# Patient Record
Sex: FEMALE | Race: WHITE
[De-identification: names, ages, dates, MRNs, and addresses within clinical notes are randomized per-mention and may not be internally consistent; named-entity substitution may affect disease eponyms.]

---

## 2018-01-02 ENCOUNTER — HOSPITAL ENCOUNTER (INPATIENT)
Dept: HOSPITAL 92 - ERS | Age: 83
LOS: 6 days | Discharge: NURSING, REHAB OR LONG-TERM CARE | DRG: 193 | End: 2018-01-08
Attending: INTERNAL MEDICINE | Admitting: INTERNAL MEDICINE
Payer: MEDICARE

## 2018-01-02 VITALS — BODY MASS INDEX: 34.9 KG/M2

## 2018-01-02 DIAGNOSIS — Z86.73: ICD-10-CM

## 2018-01-02 DIAGNOSIS — Z96.653: ICD-10-CM

## 2018-01-02 DIAGNOSIS — J10.00: Primary | ICD-10-CM

## 2018-01-02 DIAGNOSIS — G89.29: ICD-10-CM

## 2018-01-02 DIAGNOSIS — J96.01: ICD-10-CM

## 2018-01-02 DIAGNOSIS — F41.9: ICD-10-CM

## 2018-01-02 DIAGNOSIS — J44.1: ICD-10-CM

## 2018-01-02 DIAGNOSIS — G30.9: ICD-10-CM

## 2018-01-02 DIAGNOSIS — K21.9: ICD-10-CM

## 2018-01-02 DIAGNOSIS — M62.82: ICD-10-CM

## 2018-01-02 DIAGNOSIS — Z66: ICD-10-CM

## 2018-01-02 DIAGNOSIS — E78.5: ICD-10-CM

## 2018-01-02 DIAGNOSIS — J10.1: ICD-10-CM

## 2018-01-02 DIAGNOSIS — E66.9: ICD-10-CM

## 2018-01-02 DIAGNOSIS — J18.9: ICD-10-CM

## 2018-01-02 DIAGNOSIS — I11.0: ICD-10-CM

## 2018-01-02 DIAGNOSIS — I50.33: ICD-10-CM

## 2018-01-02 DIAGNOSIS — M54.9: ICD-10-CM

## 2018-01-02 DIAGNOSIS — F02.80: ICD-10-CM

## 2018-01-02 LAB
ALBUMIN SERPL BCG-MCNC: 3.5 G/DL (ref 3.4–4.8)
ALP SERPL-CCNC: 108 U/L (ref 40–150)
ALT SERPL W P-5'-P-CCNC: 30 U/L (ref 8–55)
ANION GAP SERPL CALC-SCNC: 13 MMOL/L (ref 10–20)
AST SERPL-CCNC: 49 U/L (ref 5–34)
BASOPHILS # BLD AUTO: 0 THOU/UL (ref 0–0.2)
BASOPHILS NFR BLD AUTO: 0.3 % (ref 0–1)
BILIRUB SERPL-MCNC: 0.4 MG/DL (ref 0.2–1.2)
BUN SERPL-MCNC: 13 MG/DL (ref 9.8–20.1)
CALCIUM SERPL-MCNC: 8.5 MG/DL (ref 7.8–10.44)
CHLORIDE SERPL-SCNC: 98 MMOL/L (ref 98–107)
CK MB SERPL-MCNC: 1.8 NG/ML (ref 0–6.6)
CK SERPL-CCNC: 653 U/L (ref 29–168)
CO2 SERPL-SCNC: 24 MMOL/L (ref 23–31)
CREAT CL PREDICTED SERPL C-G-VRATE: 0 ML/MIN (ref 70–130)
CRYSTAL-AUWI FLAG: 0.5 (ref 0–15)
EOSINOPHIL # BLD AUTO: 0 THOU/UL (ref 0–0.7)
EOSINOPHIL NFR BLD AUTO: 0.2 % (ref 0–10)
GLOBULIN SER CALC-MCNC: 3.5 G/DL (ref 2.4–3.5)
GLUCOSE SERPL-MCNC: 107 MG/DL (ref 83–110)
HEV IGM SER QL: 22.2 (ref 0–7.99)
HGB BLD-MCNC: 13.6 G/DL (ref 12–16)
HYALINE CASTS #/AREA URNS LPF: (no result) LPF
LYMPHOCYTES # BLD: 1.1 THOU/UL (ref 1.2–3.4)
LYMPHOCYTES NFR BLD AUTO: 12.7 % (ref 21–51)
MCH RBC QN AUTO: 30.5 PG (ref 27–31)
MCV RBC AUTO: 91.1 FL (ref 81–99)
MONOCYTES # BLD AUTO: 0.8 THOU/UL (ref 0.11–0.59)
MONOCYTES NFR BLD AUTO: 9 % (ref 0–10)
NEUTROPHILS # BLD AUTO: 6.6 THOU/UL (ref 1.4–6.5)
NEUTROPHILS NFR BLD AUTO: 77.7 % (ref 42–75)
PATHC CAST-AUWI FLAG: 9.35 (ref 0–2.49)
PLATELET # BLD AUTO: 188 THOU/UL (ref 130–400)
POTASSIUM SERPL-SCNC: 3.7 MMOL/L (ref 3.5–5.1)
PROT UR STRIP.AUTO-MCNC: 30 MG/DL
RBC # BLD AUTO: 4.45 MILL/UL (ref 4.2–5.4)
RBC UR QL AUTO: (no result) HPF (ref 0–3)
SODIUM SERPL-SCNC: 131 MMOL/L (ref 136–145)
SP GR UR STRIP: 1.03 (ref 1–1.04)
SPERM-AUWI FLAG: 0 (ref 0–9.9)
TROPONIN I SERPL DL<=0.01 NG/ML-MCNC: 0.01 NG/ML (ref ?–0.03)
TROPONIN I SERPL DL<=0.01 NG/ML-MCNC: 0.02 NG/ML (ref ?–0.03)
WBC # BLD AUTO: 8.5 THOU/UL (ref 4.8–10.8)
WBC UR QL AUTO: (no result) HPF (ref 0–3)
YEAST-AUWI FLAG: 0 (ref 0–25)

## 2018-01-02 PROCEDURE — 51701 INSERT BLADDER CATHETER: CPT

## 2018-01-02 PROCEDURE — 87633 RESP VIRUS 12-25 TARGETS: CPT

## 2018-01-02 PROCEDURE — 71250 CT THORAX DX C-: CPT

## 2018-01-02 PROCEDURE — 80048 BASIC METABOLIC PNL TOTAL CA: CPT

## 2018-01-02 PROCEDURE — 82553 CREATINE MB FRACTION: CPT

## 2018-01-02 PROCEDURE — 96365 THER/PROPH/DIAG IV INF INIT: CPT

## 2018-01-02 PROCEDURE — 71045 X-RAY EXAM CHEST 1 VIEW: CPT

## 2018-01-02 PROCEDURE — A4216 STERILE WATER/SALINE, 10 ML: HCPCS

## 2018-01-02 PROCEDURE — 81003 URINALYSIS AUTO W/O SCOPE: CPT

## 2018-01-02 PROCEDURE — 93005 ELECTROCARDIOGRAM TRACING: CPT

## 2018-01-02 PROCEDURE — 36415 COLL VENOUS BLD VENIPUNCTURE: CPT

## 2018-01-02 PROCEDURE — 94640 AIRWAY INHALATION TREATMENT: CPT

## 2018-01-02 PROCEDURE — 80053 COMPREHEN METABOLIC PANEL: CPT

## 2018-01-02 PROCEDURE — A4353 INTERMITTENT URINARY CATH: HCPCS

## 2018-01-02 PROCEDURE — 87040 BLOOD CULTURE FOR BACTERIA: CPT

## 2018-01-02 PROCEDURE — 96361 HYDRATE IV INFUSION ADD-ON: CPT

## 2018-01-02 PROCEDURE — 93798 PHYS/QHP OP CAR RHAB W/ECG: CPT

## 2018-01-02 PROCEDURE — 81015 MICROSCOPIC EXAM OF URINE: CPT

## 2018-01-02 PROCEDURE — 84484 ASSAY OF TROPONIN QUANT: CPT

## 2018-01-02 PROCEDURE — 83880 ASSAY OF NATRIURETIC PEPTIDE: CPT

## 2018-01-02 PROCEDURE — 87149 DNA/RNA DIRECT PROBE: CPT

## 2018-01-02 PROCEDURE — 87086 URINE CULTURE/COLONY COUNT: CPT

## 2018-01-02 PROCEDURE — 36416 COLLJ CAPILLARY BLOOD SPEC: CPT

## 2018-01-02 PROCEDURE — 96375 TX/PRO/DX INJ NEW DRUG ADDON: CPT

## 2018-01-02 PROCEDURE — 93306 TTE W/DOPPLER COMPLETE: CPT

## 2018-01-02 PROCEDURE — 85025 COMPLETE CBC W/AUTO DIFF WBC: CPT

## 2018-01-02 NOTE — HP
REASON FOR ADMISSION:  Left lung pneumonia.

 

HISTORY OF PRESENT ILLNESS:  Patient was sent from [facility] for a fever of 103, this h
appened at around 12:00 noon.  She was given 2 tablets of Tylenol.  The patient's respiratory rate wa
s 28.  The patient gives history of dry cough from the last 3 days.  She does not take her flu shot. 
 She has been at the nursing home for the last 3 years or so.  She has not ambulated from last 3 year
s.  Currently, responds well to all questions.  She is a bit hard of hearing.  On arrival here, had a
 temperature of 99.3 degrees and saturating 97% on room air.

 

PAST MEDICAL/SURGICAL HISTORY:  Chronic back pain, hypertension, CHF with diastolic dysfunction, dysl
ipidemia, anxiety disorder, obesity, history of CVA with no residual paralysis, prior history of rhab
domyolysis, dementia, GERD, bilateral knee replacement, appendectomy, cholecystectomy, hysterectomy, 
left leg surgery.

 

CURRENT MEDICATIONS:  Pepcid 20 mg p.o. twice daily, Valium 5 mg p.o. daily p.r.n. for anxiety, aspir
in 81 mg p.o. daily, DuoNebs q.i.d., Prozac 40 mg p.o. daily, Tylenol No. 3 q.6 hourly p.r.n., Vytori
n 10/40 mg p.o. daily, Ultram p.r.n. q.6 hourly, MiraLax 17 grams daily, Namenda 10 mg p.o. twice susan
ly, furosemide 20 mg p.o. daily, Trileptal 300 mg p.o. twice daily, trazodone 50 mg p.o. at bedtime p
.r.n. for insomnia, donepezil 10 mg p.o. at bedtime.

 

ALLERGIES:  No known drug allergies.

 

PERSONAL HISTORY:  Does not abuse alcohol or drugs.  The patient is a nursing home resident at [facility].

 

FAMILY HISTORY:  The patient states she has got 1 sister and 2 brothers.  Mom  at the age of 94. 
 Father  at the age of 74 years.

 

REVIEW OF SYSTEMS:  The following complete review of systems was negative, unless otherwise mentioned
 in the HPI or below:

Constitutional:  Weight loss or gain, ability to conduct usual activities.

Skin:  Rash, itching.

Eyes:  Double vision, pain.

ENT/Mouth:  Nose bleeding, neck stiffness, pain, tenderness.

Cardiovascular:  Palpitations, dyspnea on exertion, orthopnea.

Respiratory:  Shortness of breath, wheezing, cough, hemoptysis, fever or night sweats.

Gastrointestinal:  Poor appetite, abdominal pain, heartburn, nausea, vomiting, constipation, or diarr
hea.

Genitourinary:  Urgency, frequency, dysuria, nocturia.

Musculoskeletal:  Pain, swelling.

Neurologic/Psychiatric:  Anxiety, depression.

Allergy/Immunologic:  Skin rash, bleeding tendency.

 

PHYSICAL EXAMINATION:

GENERAL:  The patient is an 82-year-old female who is currently not in any acute distress.

VITAL SIGNS:  Blood pressure 124/62, pulse 86 per minute, respiratory rate 18 per minute, temperature
 99.3 degrees here and was 103 degrees at the nursing home, saturating 97% on room air.

NECK:  Supple, no elevated JVD.

HEENT:  Extraocular muscles intact.  Pupils reacting to light.  Oral cavity mucous membranes are mois
t.  No exudates or congestion.

CARDIOVASCULAR:  S1, S2 heard.  Regular rhythm.

RESPIRATORY SYSTEM:  Air entry 1+ bilateral.  Scattered rhonchi plus bilateral.

ABDOMEN:  Soft, bowel sounds heard.  No tenderness, rigidity or guarding.

EXTREMITIES:  No peripheral edema or calf tenderness.

VASCULAR SYSTEM:  Peripheral pulses 1+ bilateral.  No ischemic ulcerations or gangrene.  The patient 
seems to have some small muscle atrophy of her foot and the leg area of both lower extremities.

PSYCHIATRIC:  The patient's mood is a bit anxious, otherwise no hallucinations or delusions.

 

LABORATORY AND X-RAY FINDINGS:  Chest x-ray done shows pulmonary vascular congestion with questionabl
e left lower lobe infiltrate.  Influenza A and B antigens are negative.  UA shows no evidence of infe
ction.  CK level 653.  Liver enzymes:  AST 49, ALT 30, total bilirubin 0.4.  BNP is 99.  Albumin is 3
.5.  Sodium 131.  H and H 13 and 40, platelet count 188 with 77% neutrophils, MCV is 91.  EKG done sh
ows normal sinus rhythm at 83 beats per minute.

 

CLINICAL IMPRESSION AND PLAN:  Patient will be admitted to medical floor for left lower lobe pneumoni
a.  We will place her on Levaquin 750 mg IV daily.  She will be on DuoNebs q.6 hourly.  We will obtai
n a viral PCR as patient does not take her flu shot.  We will continue her Trileptal, Namenda, donepe
zil, Prozac, trazodone, aspirin as before.  Echo with 2D Doppler for LV function will be obtained.  B
lood and urine cultures have been sent from the ER.  We will continue to closely monitor her on medic
al floor.  Please note patient's code status is DNR.  I have discussed this with the patient.  She al
so carries out of hospital DNR dated 2015.

## 2018-01-02 NOTE — RAD
FRONTAL VIEW CHEST:

 

Indication: Cough. 

 

Comparison: 8-9-17

 

FINDINGS: 

Cardiac silhouette is prominent. There is patchy bilateral alveolar opacification which may be on the
 basis of edema related to fluid overload. Small volume pleural effusions are suspected bilaterally. 
No additional significant change. 

 

IMPRESSION: 

1. Findings most consistent with CHF and edema. 

2. Consider imaging follow up to confirm resolution.

 

POS: [facility]

## 2018-01-03 LAB
ANION GAP SERPL CALC-SCNC: 13 MMOL/L (ref 10–20)
BUN SERPL-MCNC: 14 MG/DL (ref 9.8–20.1)
CALCIUM SERPL-MCNC: 8 MG/DL (ref 7.8–10.44)
CHLORIDE SERPL-SCNC: 100 MMOL/L (ref 98–107)
CO2 SERPL-SCNC: 24 MMOL/L (ref 23–31)
CREAT CL PREDICTED SERPL C-G-VRATE: 101 ML/MIN (ref 70–130)
GLUCOSE SERPL-MCNC: 94 MG/DL (ref 83–110)
HGB BLD-MCNC: 12.6 G/DL (ref 12–16)
MCH RBC QN AUTO: 30.7 PG (ref 27–31)
MCV RBC AUTO: 92.1 FL (ref 81–99)
MDIFF COMPLETE?: YES
PLATELET # BLD AUTO: 187 THOU/UL (ref 130–400)
PLATELET BLD QL SMEAR: (no result)
POTASSIUM SERPL-SCNC: 3.8 MMOL/L (ref 3.5–5.1)
RBC # BLD AUTO: 4.1 MILL/UL (ref 4.2–5.4)
SODIUM SERPL-SCNC: 133 MMOL/L (ref 136–145)
WBC # BLD AUTO: 3.8 THOU/UL (ref 4.8–10.8)

## 2018-01-03 RX ADMIN — Medication SCH ML: at 09:26

## 2018-01-03 RX ADMIN — Medication SCH ML: at 21:04

## 2018-01-03 RX ADMIN — Medication SCH: at 05:56

## 2018-01-03 NOTE — PDOC.PN
- Subjective


Encounter Start Date: 01/03/18


Encounter Start Time: 11:15


Subjective: breathing better, has cough





- Objective


MAR Reviewed: Yes


Vital Signs & Weight: 


 Vital Signs (12 hours)











  Temp Pulse Resp BP Pulse Ox


 


 01/03/18 12:30  98.5 F  65  16  90/57 L  93 L


 


 01/03/18 08:23   78  16   94 L


 


 01/03/18 08:00  98.8 F  78  16  


 


 01/03/18 07:18  98.8 F  78  16  105/55 L  94 L


 


 01/03/18 04:00  98.5 F  79  18  103/66  94 L








 Weight











Weight                         231 lb 3.2 oz














Result Diagrams: 


 01/03/18 04:44





 01/03/18 04:44





Phys Exam





- Physical Examination


HEENT: PERRLA, moist MMs


Neck: no JVD, supple


Respiratory: no wheezing, no rales


rhonchi+


Cardiovascular: RRR, no significant murmur


Gastrointestinal: soft, non-tender, no distention, positive bowel sounds


Musculoskeletal: no edema, pulses present


Neurological: non-focal, moves all 4 limbs





Dx/Plan


(1) PNA (pneumonia)


Code(s): J18.9 - PNEUMONIA, UNSPECIFIED ORGANISM   Status: Acute   


Qualifiers: 


   Pneumonia type: due to unspecified organism 





(2) influenza A


Status: Acute   





(3) Dyslipidemia


Code(s): E78.5 - HYPERLIPIDEMIA, UNSPECIFIED   Status: Chronic   





(4) GERD (gastroesophageal reflux disease)


Code(s): K21.9 - GASTRO-ESOPHAGEAL REFLUX DISEASE WITHOUT ESOPHAGITIS   Status: 

Chronic   


Qualifiers: 


   Esophagitis presence: esophagitis presence not specified   Qualified Code(s)

: K21.9 - Gastro-esophageal reflux disease without esophagitis   





(5) HTN (hypertension)


Code(s): I10 - ESSENTIAL (PRIMARY) HYPERTENSION   Status: Chronic   


Qualifiers: 


   Hypertension type: essential hypertension   Qualified Code(s): I10 - 

Essential (primary) hypertension   





(6) Obesity (BMI 30-39.9)


Code(s): E66.9 - OBESITY, UNSPECIFIED   Status: Chronic   





- Plan


on tamiflu, nebs


-: empiric levaquin


-: home meds


-: dc plan in am 


-: oob to chair as tolerated, incentive spirometry





* .








Review of Systems





- Medications/Allergies


Allergies/Adverse Reactions: 


 Allergies











Allergy/AdvReac Type Severity Reaction Status Date / Time


 


No Known Allergies Allergy   Verified 01/02/18 18:32











Medications: 


 Current Medications





Acetaminophen (Tylenol)  650 mg PO Q4H PRN


   PRN Reason: Headache/Fever or Pain


Albuterol/Ipratropium (Duoneb)  3 ml NEB QID-RT [facility]


   Last Admin: 01/03/18 11:45 Dose:  3 ml


Aspirin (Aspirin Chewable)  81 mg PO DAILY [facility]


   Last Admin: 01/03/18 09:25 Dose:  81 mg


Carvedilol (Coreg)  3.125 mg PO BID [facility]


   Last Admin: 01/03/18 09:25 Dose:  3.125 mg


Docusate Sodium (Colace)  100 mg PO BID [facility]


   Last Admin: 01/03/18 09:25 Dose:  100 mg


Donepezil HCl (Aricept)  10 mg PO HS [facility]


   Last Admin: 01/02/18 20:58 Dose:  10 mg


Enoxaparin Sodium (Lovenox)  40 mg SC 0900 [facility]


   Last Admin: 01/03/18 09:25 Dose:  40 mg


Famotidine (Pepcid)  20 mg PO BID [facility]


   Last Admin: 01/03/18 09:25 Dose:  20 mg


Fluoxetine HCl (Prozac)  40 mg PO DAILY [facility]


   Last Admin: 01/03/18 09:25 Dose:  40 mg


Furosemide (Lasix)  40 mg PO DAILY-AC [facility]


   Last Admin: 01/03/18 09:25 Dose:  40 mg


Guaifenesin/Dextromethorphan (Robitussin Dm)  15 ml PO Q4H PRN


   PRN Reason: Cough


Levofloxacin 750 mg/ Device  150 mls @ 100 mls/hr IVPB Q24HR [facility]


   Last Admin: 01/02/18 20:56 Dose:  150 mls


Memantine (Namenda)  10 mg PO BID [facility]


   Last Admin: 01/03/18 09:25 Dose:  10 mg


Ondansetron HCl (Zofran)  4 mg IVP Q6H PRN


   PRN Reason: Nausea/Vomiting


   Last Admin: 01/03/18 11:10 Dose:  4 mg


Ondansetron HCl (Zofran Odt)  4 mg PO Q6H PRN


   PRN Reason: Nausea/Vomiting


Oseltamivir Phosphate (Tamiflu)  75 mg PO BID [facility]


   Stop: 01/08/18 09:01


Oxcarbazepine (Trileptal)  300 mg PO BID [facility]


   Last Admin: 01/03/18 09:25 Dose:  300 mg


Sodium Chloride (Flush - Normal Saline)  10 ml IVF Q12HR [facility]


   Last Admin: 01/03/18 09:26 Dose:  10 ml


Sodium Chloride (Flush - Normal Saline)  10 ml IVF PRN PRN


   PRN Reason: Saline Flush


Tramadol HCl (Ultram)  50 mg PO QID PRN


   PRN Reason: Pain


Trazodone HCl (Desyrel)  50 mg PO HS [facility]


   Last Admin: 01/02/18 20:58 Dose:  50 mg

## 2018-01-03 NOTE — CT
CT CHEST WITHOUT CONTRAST:

 

HISTORY:

Shortness of breath.

 

FINDINGS:

Absence of IV contrast reduces the sensitivity of the exam, particularly for evaluation of mediastina
l, hilar, and vascular structures.

 

There are vascular calcifications without evidence of aneurysmal dilatation of the thoracic aorta.  A
 1 cm right paratracheal lymph node is present.  No pleural or pericardial effusions are seen.  There
 is evidence of old granulomatous disease, manifested by calcified granulomas in the thorax.  Chronic
 changes in the lung fields are seen.  No lobar consolidation or pleural or pericardial effusions are
 identified.  There are degenerative changes in the spine.

 

IMPRESSION:

Chronic lung changes.

 

POS: [facility]

## 2018-01-04 RX ADMIN — Medication SCH ML: at 20:45

## 2018-01-04 RX ADMIN — Medication SCH ML: at 08:31

## 2018-01-04 NOTE — PDOC.PN
- Subjective


Encounter Start Date: 01/04/18


Encounter Start Time: 11:00


Subjective: c/o coughing and wheezing


-: says she is constipated, no nausea


-: didn't eat her breakfast





- Objective


MAR Reviewed: Yes


Vital Signs & Weight: 


 Vital Signs (12 hours)











  Temp Pulse Resp BP Pulse Ox


 


 01/04/18 08:00  100.1 F H  77  18   91 L


 


 01/04/18 07:57  100.1 F H  77  18  104/56 L  91 L


 


 01/04/18 06:16   77  16   95


 


 01/04/18 04:00  98.8 F  83  20  130/91 H  93 L








 Weight











Weight                         229 lb 9.6 oz














Result Diagrams: 


 01/03/18 04:44





 01/03/18 04:44





Phys Exam





- Physical Examination


HEENT: PERRLA, moist MMs


Neck: no JVD, supple


Respiratory: no rales, wheezing present


Cardiovascular: RRR, no significant murmur


Gastrointestinal: soft, non-tender, positive bowel sounds


Musculoskeletal: no edema, pulses present


Neurological: non-focal, moves all 4 limbs


Psychiatric: A&O x 3





Dx/Plan


(1) PNA (pneumonia)


Code(s): J18.9 - PNEUMONIA, UNSPECIFIED ORGANISM   Status: Acute   


Qualifiers: 


   Pneumonia type: due to unspecified organism 





(2) influenza A


Status: Acute   





(3) Dyslipidemia


Code(s): E78.5 - HYPERLIPIDEMIA, UNSPECIFIED   Status: Chronic   





(4) GERD (gastroesophageal reflux disease)


Code(s): K21.9 - GASTRO-ESOPHAGEAL REFLUX DISEASE WITHOUT ESOPHAGITIS   Status: 

Chronic   


Qualifiers: 


   Esophagitis presence: esophagitis presence not specified   Qualified Code(s)

: K21.9 - Gastro-esophageal reflux disease without esophagitis   





(5) HTN (hypertension)


Code(s): I10 - ESSENTIAL (PRIMARY) HYPERTENSION   Status: Chronic   


Qualifiers: 


   Hypertension type: essential hypertension   Qualified Code(s): I10 - 

Essential (primary) hypertension   





(6) Obesity (BMI 30-39.9)


Code(s): E66.9 - OBESITY, UNSPECIFIED   Status: Chronic   





- Plan


has mild asthma/copd exacerbation/bronchitis from influenza


-: will add steroids for wheezing


-: is on levaquin and tamiflu


-: oob to chair as tolerated


-: i.spirometry





* .








Review of Systems





- Medications/Allergies


Allergies/Adverse Reactions: 


 Allergies











Allergy/AdvReac Type Severity Reaction Status Date / Time


 


No Known Allergies Allergy   Verified 01/02/18 18:32











Medications: 


 Current Medications





Acetaminophen (Tylenol)  650 mg PO Q4H PRN


   PRN Reason: Headache/Fever or Pain


   Last Admin: 01/04/18 08:27 Dose:  650 mg


Albuterol/Ipratropium (Duoneb)  3 ml NEB QID-RT [facility]


   Last Admin: 01/04/18 10:22 Dose:  3 ml


Aspirin (Aspirin Chewable)  81 mg PO DAILY [facility]


   Last Admin: 01/04/18 08:27 Dose:  81 mg


Bisacodyl (Dulcolax)  10 mg [place] Q8H PRN


   PRN Reason: Constipation


Carvedilol (Coreg)  3.125 mg PO BID [facility]


   Last Admin: 01/04/18 08:29 Dose:  3.125 mg


Docusate Sodium (Colace)  100 mg PO BID [facility]


   Last Admin: 01/04/18 08:30 Dose:  100 mg


Donepezil HCl (Aricept)  10 mg PO HS [facility]


   Last Admin: 01/03/18 21:04 Dose:  10 mg


Enoxaparin Sodium (Lovenox)  40 mg SC 0900 [facility]


   Last Admin: 01/04/18 08:30 Dose:  40 mg


Famotidine (Pepcid)  20 mg PO BID [facility]


   Last Admin: 01/04/18 08:27 Dose:  20 mg


Fluoxetine HCl (Prozac)  40 mg PO DAILY [facility]


   Last Admin: 01/04/18 08:26 Dose:  40 mg


Furosemide (Lasix)  40 mg PO DAILY-AC [facility]


   Last Admin: 01/04/18 08:30 Dose:  40 mg


Guaifenesin/Dextromethorphan (Robitussin Dm)  15 ml PO Q4H PRN


   PRN Reason: Cough


Levofloxacin 750 mg/ Device  150 mls @ 100 mls/hr IVPB Q24HR [facility]


   Last Admin: 01/03/18 21:04 Dose:  150 mls


Memantine (Namenda)  10 mg PO BID [facility]


   Last Admin: 01/04/18 08:27 Dose:  10 mg


Methylprednisolone Sodium Succinate (Solu-Medrol)  40 mg IVP Q6HR [facility]


Ondansetron HCl (Zofran)  4 mg IVP Q6H PRN


   PRN Reason: Nausea/Vomiting


   Last Admin: 01/04/18 08:30 Dose:  4 mg


Ondansetron HCl (Zofran Odt)  4 mg PO Q6H PRN


   PRN Reason: Nausea/Vomiting


Oseltamivir Phosphate (Tamiflu)  75 mg PO BID [facility]


   Stop: 01/08/18 09:01


   Last Admin: 01/04/18 08:26 Dose:  75 mg


Oxcarbazepine (Trileptal)  300 mg PO BID [facility]


   Last Admin: 01/04/18 08:27 Dose:  300 mg


Polyethylene Glycol (Miralax)  17 gm PO DAILY [facility]


Sodium Chloride (Flush - Normal Saline)  10 ml IVF Q12HR [facility]


   Last Admin: 01/04/18 08:31 Dose:  10 ml


Sodium Chloride (Flush - Normal Saline)  10 ml IVF PRN PRN


   PRN Reason: Saline Flush


Tramadol HCl (Ultram)  50 mg PO QID PRN


   PRN Reason: Pain


   Last Admin: 01/04/18 03:53 Dose:  50 mg


Trazodone HCl (Desyrel)  50 mg PO HS [facility]


   Last Admin: 01/03/18 21:03 Dose:  50 mg

## 2018-01-05 LAB
ANION GAP SERPL CALC-SCNC: 15 MMOL/L (ref 10–20)
BASOPHILS # BLD AUTO: 0 THOU/UL (ref 0–0.2)
BASOPHILS NFR BLD AUTO: 0.6 % (ref 0–1)
BUN SERPL-MCNC: 14 MG/DL (ref 9.8–20.1)
CALCIUM SERPL-MCNC: 9.1 MG/DL (ref 7.8–10.44)
CHLORIDE SERPL-SCNC: 99 MMOL/L (ref 98–107)
CO2 SERPL-SCNC: 25 MMOL/L (ref 23–31)
CREAT CL PREDICTED SERPL C-G-VRATE: 96 ML/MIN (ref 70–130)
EOSINOPHIL # BLD AUTO: 0 THOU/UL (ref 0–0.7)
EOSINOPHIL NFR BLD AUTO: 0 % (ref 0–10)
GLUCOSE SERPL-MCNC: 109 MG/DL (ref 83–110)
HGB BLD-MCNC: 13.5 G/DL (ref 12–16)
LYMPHOCYTES # BLD: 1.1 THOU/UL (ref 1.2–3.4)
LYMPHOCYTES NFR BLD AUTO: 28.1 % (ref 21–51)
MCH RBC QN AUTO: 31 PG (ref 27–31)
MCV RBC AUTO: 93 FL (ref 81–99)
MONOCYTES # BLD AUTO: 0.4 THOU/UL (ref 0.11–0.59)
MONOCYTES NFR BLD AUTO: 10.5 % (ref 0–10)
NEUTROPHILS # BLD AUTO: 2.3 THOU/UL (ref 1.4–6.5)
NEUTROPHILS NFR BLD AUTO: 60.8 % (ref 42–75)
PLATELET # BLD AUTO: 201 THOU/UL (ref 130–400)
POTASSIUM SERPL-SCNC: 4.3 MMOL/L (ref 3.5–5.1)
RBC # BLD AUTO: 4.34 MILL/UL (ref 4.2–5.4)
SODIUM SERPL-SCNC: 135 MMOL/L (ref 136–145)
WBC # BLD AUTO: 3.7 THOU/UL (ref 4.8–10.8)

## 2018-01-05 RX ADMIN — Medication SCH ML: at 19:52

## 2018-01-05 RX ADMIN — Medication SCH ML: at 08:21

## 2018-01-05 NOTE — PDOC.PN
- Subjective


Encounter Start Date: 01/05/18


Encounter Start Time: 10:50


Subjective: has sob but better


-: had bm last night, no abd pain





- Objective


MAR Reviewed: Yes


Vital Signs & Weight: 


 Vital Signs (12 hours)











  Temp Pulse Resp BP Pulse Ox


 


 01/05/18 09:33   64  14   92 L


 


 01/05/18 08:00  97.6 F  64  14  137/75  90 L


 


 01/05/18 06:17   77  16   92 L








 Weight











Weight                         229 lb 14.4 oz














I&O: 


 











 01/04/18 01/05/18 01/06/18





 06:59 06:59 06:59


 


Intake Total  1550 


 


Balance  1550 











Result Diagrams: 


 01/05/18 08:47





 01/05/18 08:47





Phys Exam





- Physical Examination


HEENT: PERRLA, moist MMs


Neck: no JVD, supple


Respiratory: no rales, wheezing present


Cardiovascular: RRR, no significant murmur, no rub


Gastrointestinal: soft, non-tender, no distention, positive bowel sounds


Musculoskeletal: no edema, pulses present


Neurological: non-focal, moves all 4 limbs


Psychiatric: A&O x 3





Dx/Plan


(1) PNA (pneumonia)


Code(s): J18.9 - PNEUMONIA, UNSPECIFIED ORGANISM   Status: Acute   


Qualifiers: 


   Pneumonia type: due to unspecified organism 





(2) influenza A


Status: Acute   





(3) Dyslipidemia


Code(s): E78.5 - HYPERLIPIDEMIA, UNSPECIFIED   Status: Chronic   





(4) GERD (gastroesophageal reflux disease)


Code(s): K21.9 - GASTRO-ESOPHAGEAL REFLUX DISEASE WITHOUT ESOPHAGITIS   Status: 

Chronic   


Qualifiers: 


   Esophagitis presence: esophagitis presence not specified   Qualified Code(s)

: K21.9 - Gastro-esophageal reflux disease without esophagitis   





(5) HTN (hypertension)


Code(s): I10 - ESSENTIAL (PRIMARY) HYPERTENSION   Status: Chronic   


Qualifiers: 


   Hypertension type: essential hypertension   Qualified Code(s): I10 - 

Essential (primary) hypertension   





(6) Obesity (BMI 30-39.9)


Code(s): E66.9 - OBESITY, UNSPECIFIED   Status: Chronic   





- Plan


on omnicef, tamiflu


-: oral prednisone from today


-: nebs, OOB to chair , i.spirometry


-: dc plan in am to snf if wheezing resolves





* .








Review of Systems





- Medications/Allergies


Allergies/Adverse Reactions: 


 Allergies











Allergy/AdvReac Type Severity Reaction Status Date / Time


 


No Known Allergies Allergy   Verified 01/02/18 18:32











Medications: 


 Current Medications





Acetaminophen (Tylenol)  650 mg PO Q4H PRN


   PRN Reason: Headache/Fever or Pain


   Last Admin: 01/04/18 08:27 Dose:  650 mg


Albuterol/Ipratropium (Duoneb)  3 ml NEB QID-RT [facility]


   Last Admin: 01/05/18 09:33 Dose:  3 ml


Aspirin (Aspirin Chewable)  81 mg PO DAILY [facility]


   Last Admin: 01/05/18 08:20 Dose:  81 mg


Bisacodyl (Dulcolax)  10 mg [place] Q8H PRN


   PRN Reason: Constipation


   Last Admin: 01/04/18 16:55 Dose:  10 mg


Carvedilol (Coreg)  3.125 mg PO BID [facility]


   Last Admin: 01/05/18 08:23 Dose:  3.125 mg


Docusate Sodium (Colace)  100 mg PO BID [facility]


   Last Admin: 01/05/18 08:20 Dose:  100 mg


Donepezil HCl (Aricept)  10 mg PO HS [facility]


   Last Admin: 01/04/18 20:39 Dose:  10 mg


Enoxaparin Sodium (Lovenox)  40 mg SC 0900 [facility]


   Last Admin: 01/05/18 08:19 Dose:  40 mg


Famotidine (Pepcid)  20 mg PO BID [facility]


   Last Admin: 01/05/18 08:20 Dose:  20 mg


Fluoxetine HCl (Prozac)  40 mg PO DAILY [facility]


   Last Admin: 01/05/18 08:20 Dose:  40 mg


Furosemide (Lasix)  40 mg PO DAILY-AC [facility]


   Last Admin: 01/05/18 08:20 Dose:  40 mg


Guaifenesin/Dextromethorphan (Robitussin Dm)  15 ml PO Q4H PRN


   PRN Reason: Cough


Levofloxacin 750 mg/ Device  150 mls @ 100 mls/hr IVPB Q24HR [facility]


   Last Admin: 01/04/18 20:38 Dose:  150 mls


Memantine (Namenda)  10 mg PO BID [facility]


   Last Admin: 01/05/18 08:20 Dose:  10 mg


Methylprednisolone Sodium Succinate (Solu-Medrol)  40 mg IVP Q6HR [facility]


   Last Admin: 01/05/18 11:29 Dose:  40 mg


Ondansetron HCl (Zofran)  4 mg IVP Q6H PRN


   PRN Reason: Nausea/Vomiting


   Last Admin: 01/04/18 08:30 Dose:  4 mg


Ondansetron HCl (Zofran Odt)  4 mg PO Q6H PRN


   PRN Reason: Nausea/Vomiting


Oseltamivir Phosphate (Tamiflu)  75 mg PO BID [facility]


   Stop: 01/08/18 09:01


   Last Admin: 01/05/18 08:20 Dose:  75 mg


Oxcarbazepine (Trileptal)  300 mg PO BID [facility]


   Last Admin: 01/05/18 08:20 Dose:  300 mg


Polyethylene Glycol (Miralax)  17 gm PO DAILY [facility]


   Last Admin: 01/05/18 08:19 Dose:  17 gm


Sodium Chloride (Flush - Normal Saline)  10 ml IVF Q12HR [facility]


   Last Admin: 01/05/18 08:21 Dose:  10 ml


Sodium Chloride (Flush - Normal Saline)  10 ml IVF PRN PRN


   PRN Reason: Saline Flush


Tramadol HCl (Ultram)  50 mg PO QID PRN


   PRN Reason: Pain


   Last Admin: 01/04/18 20:39 Dose:  50 mg


Trazodone HCl (Desyrel)  50 mg PO HS [facility]


   Last Admin: 01/04/18 20:39 Dose:  50 mg

## 2018-01-06 RX ADMIN — Medication SCH ML: at 08:16

## 2018-01-06 RX ADMIN — Medication SCH ML: at 20:14

## 2018-01-06 NOTE — PDOC.PN
- Subjective


Encounter Start Date: 01/06/18


Encounter Start Time: 14:43





Ms. Hi was seen in follow-up of Respiratory failure due to Influenza. She 

is having some wheezing again, and shortness of breath. Her nurse notes that 

her oxygen saturations dropped when she removed the supplemental Oxygen





- Objective


MAR Reviewed: Yes


Vital Signs & Weight: 


 Vital Signs (12 hours)











  Temp Pulse Resp BP Pulse Ox


 


 01/06/18 13:07      92 L


 


 01/06/18 13:06   66  16  


 


 01/06/18 08:00  97.7 F  67  22 H  137/76  93 L








 Weight











Weight                         230 lb 1.6 oz














I&O: 


 











 01/05/18 01/06/18 01/07/18





 06:59 06:59 06:59


 


Intake Total 1550 800 


 


Balance 1550 800 











Result Diagrams: 


 01/05/18 08:47





 01/05/18 08:47





Phys Exam





- Physical Examination


HEENT: PERRLA


Respiratory: wheezing present


+ scattered wheezing, no rhonchi, rales at the bases


Cardiovascular: RRR, no significant murmur, no rub


Gastrointestinal: soft, non-tender, positive bowel sounds


Musculoskeletal: no edema





Dx/Plan


(1) influenza A


Status: Acute   





(2) Chronic diastolic heart failure


Code(s): I50.32 - CHRONIC DIASTOLIC (CONGESTIVE) HEART FAILURE   Status: 

Chronic   





(3) Dementia


Code(s): F03.90 - UNSPECIFIED DEMENTIA WITHOUT BEHAVIORAL DISTURBANCE   Status: 

Chronic   


Qualifiers: 


   Dementia type: Alzheimer's disease 





(4) HTN (hypertension)


Code(s): I10 - ESSENTIAL (PRIMARY) HYPERTENSION   Status: Chronic   


Qualifiers: 


   Hypertension type: essential hypertension   Qualified Code(s): I10 - 

Essential (primary) hypertension   





- Plan





* Influenza A- will continue Tamiflu, and duonebs


* Continue Prednisone


* HTN- blood pressure is stable.


* Diastolic dysfunction- ? compensated- will re-evaluate in am, if wheezing 

persists wll check a chest X-ray to rule out volume overload as a possibly cause

## 2018-01-07 RX ADMIN — Medication SCH ML: at 20:02

## 2018-01-07 RX ADMIN — Medication SCH ML: at 09:05

## 2018-01-07 NOTE — PDOC.PN
- Subjective


Encounter Start Date: 01/07/18


Encounter Start Time: 14:23





Ms. Hi was seen today in follow-up. she says she is breathing better. She 

has less wheezing and cough.





- Objective


MAR Reviewed: Yes


Vital Signs & Weight: 


 Vital Signs (12 hours)











  Temp Pulse Resp BP Pulse Ox


 


 01/07/18 12:58   65  12  


 


 01/07/18 08:00  98.4 F  69  22 H  134/70  91 L


 


 01/07/18 03:45      92 L








 Weight











Weight                         226 lb














I&O: 


 











 01/06/18 01/07/18 01/08/18





 06:59 06:59 06:59


 


Intake Total 800  


 


Balance 800  











Result Diagrams: 


 01/05/18 08:47





 01/05/18 08:47





Phys Exam





- Physical Examination


HEENT: PERRLA


Respiratory: wheezing present


+ bilateral wheezing, much less than yesterday- spotty throughout chest


Cardiovascular: RRR, no significant murmur


Gastrointestinal: soft, non-tender, positive bowel sounds


Musculoskeletal: no edema





Dx/Plan


(1) influenza A


Status: Acute   





(2) Chronic diastolic heart failure


Code(s): I50.32 - CHRONIC DIASTOLIC (CONGESTIVE) HEART FAILURE   Status: 

Chronic   





(3) Dementia


Code(s): F03.90 - UNSPECIFIED DEMENTIA WITHOUT BEHAVIORAL DISTURBANCE   Status: 

Chronic   


Qualifiers: 


   Dementia type: Alzheimer's disease 





(4) HTN (hypertension)


Code(s): I10 - ESSENTIAL (PRIMARY) HYPERTENSION   Status: Chronic   


Qualifiers: 


   Hypertension type: essential hypertension   Qualified Code(s): I10 - 

Essential (primary) hypertension   





- Plan





* Influenza A with acute respiratory failure- slowly improving


* Continue Omnicef and Tamiflu until course is complete


* Suspect she will be stable for transition back to the NH tomorrow.

## 2018-01-08 VITALS — DIASTOLIC BLOOD PRESSURE: 73 MMHG | TEMPERATURE: 98.2 F | SYSTOLIC BLOOD PRESSURE: 109 MMHG

## 2018-01-08 RX ADMIN — Medication SCH ML: at 08:19

## 2018-01-08 NOTE — DIS
PRIMARY CARE PHYSICIAN:  Rafal Adames M.D.

 

DATE OF ADMISSION:  01/02/2018

 

DATE OF DISCHARGE:  01/08/2018

 

DISCHARGE DISPOSITION:  Home.

 

PRIMARY DISCHARGE DIAGNOSES:

1.  Acute respiratory failure due to pneumonia.

2.  Influenza A.

3.  Acute on chronic diastolic heart failure.

4.  Hyperlipidemia.

5.  Generalized anxiety.

6.  History of cerebrovascular disease.

7.  Dementia.

8.  Gastroesophageal reflux disease.

 

DISCHARGE MEDICATIONS:  Include Omnicef 300 mg twice a day for 2 more days, aspirin 81 mg daily, Tyle
nol 650 mg q.6 hours as needed, diazepam 5 mg daily, donepezil 10 mg at bedtime, Vytorin 10/40 at bed
time, fluoxetine 40 mg twice a day, Lasix 20 mg daily, DuoNeb q.i.d. as needed, milk of magnesia 30 m
L as needed, memantine 10 mg twice a day, omeprazole 20 mg daily, Trileptal 300 mg twice a day, Gee
ax 17 grams as needed, prednisone 20 mg a day for 4 days, tramadol  mg at bedtime as needed for
 pain.

 

PROCEDURES DONE DURING ADMISSION:  The patient had an echocardiogram which showed an ejection fractio
n of 55-60%.  There was no E/A flow reversal noted suggestive of diastolic dysfunction.  The aortic v
alve was sclerotic.

 

CODE STATUS:  DNR.

 

ALLERGIES:  No known drug allergies.

 

HOSPITAL COURSE:  Ms. Laws is a pleasant 82-year-old female who was sent over from the [facility] due to high fever.  The patient was found to have bilateral pulmonary infiltrates consistent with 
edema and pneumonia could not be ruled out as well.  She was treated for community-acquired pneumonia
.  Her influenza panel by PCR came back positive for influenza A.  She was treated with Tamiflu for t
his.  She improved over the course of the next couple of days and was subsequently able to be dischar
[facility] back to the nursing [facility] on 01/08/2018.

## 2018-01-08 NOTE — PDOC.PN
- Subjective


Encounter Start Date: 01/08/18


Encounter Start Time: 09:40





Ms. Hi does not have any complaints this morning. She says she is 

breathing fine.





- Objective


MAR Reviewed: Yes


Vital Signs & Weight: 


 Vital Signs (12 hours)











  Temp Pulse Resp BP Pulse Ox


 


 01/08/18 08:00  98.2 F  69  20  109/73  93 L








 Weight











Weight                         223 lb














Result Diagrams: 


 01/05/18 08:47





 01/05/18 08:47





Phys Exam





- Physical Examination


HEENT: PERRLA


Respiratory: wheezing present


+ Mild expiratory wheeze


Cardiovascular: RRR, no significant murmur, no rub


Gastrointestinal: soft, non-tender, positive bowel sounds


Musculoskeletal: no edema





Dx/Plan


(1) influenza A


Status: Acute   





(2) Chronic diastolic heart failure


Code(s): I50.32 - CHRONIC DIASTOLIC (CONGESTIVE) HEART FAILURE   Status: 

Chronic   





(3) Dementia


Code(s): F03.90 - UNSPECIFIED DEMENTIA WITHOUT BEHAVIORAL DISTURBANCE   Status: 

Chronic   


Qualifiers: 


   Dementia type: Alzheimer's disease 





(4) HTN (hypertension)


Code(s): I10 - ESSENTIAL (PRIMARY) HYPERTENSION   Status: Chronic   


Qualifiers: 


   Hypertension type: essential hypertension   Qualified Code(s): I10 - 

Essential (primary) hypertension   





- Plan





* Influenza A with acute respiratory failure- improved


* She is stable for discharge back to the NH


* She has one more day of Tamiflu left to take


.

## 2018-03-06 ENCOUNTER — HOSPITAL ENCOUNTER (OUTPATIENT)
Dept: HOSPITAL 92 - SCSRAD | Age: 83
Discharge: HOME | End: 2018-03-06
Attending: FAMILY MEDICINE
Payer: MEDICARE

## 2018-03-06 DIAGNOSIS — R06.00: Primary | ICD-10-CM

## 2018-03-06 DIAGNOSIS — I51.7: ICD-10-CM

## 2018-03-06 PROCEDURE — 71046 X-RAY EXAM CHEST 2 VIEWS: CPT

## 2018-03-06 NOTE — RAD
2 VIEWS CHEST:

 

Date:  03/06/18 

 

INDICATION:

Follow-up pneumonia. 

 

FINDINGS:

No focal consolidation is evident. Patchy interstitial prominence likely related to underlying fibros
is is similar to the comparison CT. No pleural effusion or pneumothorax is evident. Mild cardiomegaly
 is stable. No acute osseous abnormality is noted. 

 

IMPRESSION: 

1.  Stable chronic lung changes. 

2.  Stable mild cardiomegaly without evidence of cardiac decompensation. 

 

 

[facility]: [facility]

## 2018-08-07 ENCOUNTER — HOSPITAL ENCOUNTER (EMERGENCY)
Dept: HOSPITAL 92 - ERS | Age: 83
Discharge: HOME | End: 2018-08-07
Payer: MEDICARE

## 2018-08-07 DIAGNOSIS — I50.9: ICD-10-CM

## 2018-08-07 DIAGNOSIS — I11.0: ICD-10-CM

## 2018-08-07 DIAGNOSIS — G30.9: ICD-10-CM

## 2018-08-07 DIAGNOSIS — F41.9: ICD-10-CM

## 2018-08-07 DIAGNOSIS — K21.9: ICD-10-CM

## 2018-08-07 DIAGNOSIS — G89.29: ICD-10-CM

## 2018-08-07 DIAGNOSIS — Z79.899: ICD-10-CM

## 2018-08-07 DIAGNOSIS — R10.9: Primary | ICD-10-CM

## 2018-08-07 DIAGNOSIS — F02.80: ICD-10-CM

## 2018-08-07 DIAGNOSIS — F32.9: ICD-10-CM

## 2018-08-07 LAB
ALBUMIN SERPL BCG-MCNC: 3.5 G/DL (ref 3.4–4.8)
ALP SERPL-CCNC: 102 U/L (ref 40–150)
ALT SERPL W P-5'-P-CCNC: 16 U/L (ref 8–55)
ANION GAP SERPL CALC-SCNC: 13 MMOL/L (ref 10–20)
AST SERPL-CCNC: 21 U/L (ref 5–34)
BASOPHILS # BLD AUTO: 0 THOU/UL (ref 0–0.2)
BASOPHILS NFR BLD AUTO: 0.4 % (ref 0–1)
BILIRUB SERPL-MCNC: 0.7 MG/DL (ref 0.2–1.2)
BUN SERPL-MCNC: 10 MG/DL (ref 9.8–20.1)
CALCIUM SERPL-MCNC: 8.8 MG/DL (ref 7.8–10.44)
CHLORIDE SERPL-SCNC: 101 MMOL/L (ref 98–107)
CK MB SERPL-MCNC: 0.9 NG/ML (ref 0–6.6)
CO2 SERPL-SCNC: 21 MMOL/L (ref 23–31)
CREAT CL PREDICTED SERPL C-G-VRATE: 0 ML/MIN (ref 70–130)
EOSINOPHIL # BLD AUTO: 0.2 THOU/UL (ref 0–0.7)
EOSINOPHIL NFR BLD AUTO: 1.6 % (ref 0–10)
GLOBULIN SER CALC-MCNC: 4.2 G/DL (ref 2.4–3.5)
GLUCOSE SERPL-MCNC: 105 MG/DL (ref 83–110)
HGB BLD-MCNC: 14.7 G/DL (ref 12–16)
LIPASE SERPL-CCNC: 18 U/L (ref 8–78)
LYMPHOCYTES # BLD: 1.2 THOU/UL (ref 1.2–3.4)
LYMPHOCYTES NFR BLD AUTO: 12 % (ref 21–51)
MCH RBC QN AUTO: 31.8 PG (ref 27–31)
MCV RBC AUTO: 90.6 FL (ref 78–98)
MONOCYTES # BLD AUTO: 0.8 THOU/UL (ref 0.11–0.59)
MONOCYTES NFR BLD AUTO: 8.3 % (ref 0–10)
NEUTROPHILS # BLD AUTO: 7.9 THOU/UL (ref 1.4–6.5)
NEUTROPHILS NFR BLD AUTO: 77.7 % (ref 42–75)
PLATELET # BLD AUTO: 265 THOU/UL (ref 130–400)
POTASSIUM SERPL-SCNC: 3.8 MMOL/L (ref 3.5–5.1)
RBC # BLD AUTO: 4.62 MILL/UL (ref 4.2–5.4)
SODIUM SERPL-SCNC: 131 MMOL/L (ref 136–145)
SP GR UR STRIP: 1.03 (ref 1–1.04)
TROPONIN I SERPL DL<=0.01 NG/ML-MCNC: (no result) NG/ML (ref ?–0.03)
WBC # BLD AUTO: 10.1 THOU/UL (ref 4.8–10.8)

## 2018-08-07 PROCEDURE — 93005 ELECTROCARDIOGRAM TRACING: CPT

## 2018-08-07 PROCEDURE — 51701 INSERT BLADDER CATHETER: CPT

## 2018-08-07 PROCEDURE — 81003 URINALYSIS AUTO W/O SCOPE: CPT

## 2018-08-07 PROCEDURE — 96374 THER/PROPH/DIAG INJ IV PUSH: CPT

## 2018-08-07 PROCEDURE — 83880 ASSAY OF NATRIURETIC PEPTIDE: CPT

## 2018-08-07 PROCEDURE — 80053 COMPREHEN METABOLIC PANEL: CPT

## 2018-08-07 PROCEDURE — 84484 ASSAY OF TROPONIN QUANT: CPT

## 2018-08-07 PROCEDURE — 36415 COLL VENOUS BLD VENIPUNCTURE: CPT

## 2018-08-07 PROCEDURE — 82553 CREATINE MB FRACTION: CPT

## 2018-08-07 PROCEDURE — 83690 ASSAY OF LIPASE: CPT

## 2018-08-07 PROCEDURE — 96375 TX/PRO/DX INJ NEW DRUG ADDON: CPT

## 2018-08-07 PROCEDURE — 74177 CT ABD & PELVIS W/CONTRAST: CPT

## 2018-08-07 PROCEDURE — A4353 INTERMITTENT URINARY CATH: HCPCS

## 2018-08-07 PROCEDURE — 85025 COMPLETE CBC W/AUTO DIFF WBC: CPT

## 2018-08-07 PROCEDURE — 74022 RADEX COMPL AQT ABD SERIES: CPT

## 2018-08-07 NOTE — RAD
ACUTE ABDOMINAL SERIES:

8/7/18

 

INDICATION:

History of abdominal pain with nausea.

 

COMPARISON:  

Chest radiograph 3/6/18.

 

Acute abdominal series radiograph is compared to a prior dated 4/17/11.

 

FINDINGS:  

There is some coarse interstitial prominence that appears similar to the most recent chest radiograph
 dated 3/6/18 suspicious for changes of fibrosis. No definite air space consolidation is evident. No 
pneumothorax is noted. No definite pneumoperitoneum is evident. Postsurgical changes involving the L2
-3 level is similar. Surgical clips are seen within the right upper quadrant consistent with cholecys
tectomy. Bowel gas pattern is nonspecific but without overt evidence of obstruction. Suture anchor in
volving the left pubic body is similar appearing. There are medicinal tablets seen within the right l
ower quadrant of the abdomen. There is diffuse osteopenia.

 

IMPRESSION:  

1.      No definite acute abnormality.

 

2.      Chronic lung changes as above. 

 

POS: [facility]

## 2018-08-07 NOTE — CT
CT OF THE ABDOMEN AND PELVIS WITH IV CONTRAST:

8/7/18

 

INDICATION:

History of abdominal pain, CHF.

 

COMPARISON:  

Prior exam dated 4/22/11 and CT of the thorax dated 1/3/18.

 

FINDINGS:  

There is hazy ground glass opacity within the right lung base which is new from the comparison dated 
1/3/18. There is superimposed on areas of bronchiectasis involving both lower lobes with some mild ho
neycombing involving the left lung base. 

 

The gallbladder is surgically absent. There is a 2.4 cm right renal cyst involving the  inferior pole
 right kidney. The pancreas, spleen, and adrenal glands are normal appearing. 

 

There is some fluid density present within portions of the ascending colon which is nonspecific. The 
appendix is not seen. Small bowel is of normal caliber. There is scattered vascular calcifications in
volving the abdominal and pelvic vasculature. 

 

There is diffuse osteopenia. There is instrumentation involving the L2-3 intervertebral level. There 
is partial visualization and instrumentation involving the proximal left femur. No definite acute oss
eous abnormalities evident.

 

IMPRESSION:  

1.      Ground glass opacities of the right lower lobe may reflect changes of an alveolitis that may 
be infectious or inflammatory in etiology. Atypical infectious processes can cause this appearance. T
his also can be seen with inflammatory etiolgies such as interstitial lung disease. The patient does 
have appearance of bronchiectasis and honeycombing involving both lower lobes that has progressed sin
ce the comparison in 1/3/18. 

 

2.      Right renal cyst.

 

3.      Cholecystectomy.

 

4.      Mild fluid density in the right hemicolon can be seen in diarrheal states. 

 

5.      Other chronic findings as above.

 

 

 

POS: [facility]

## 2018-08-18 NOTE — EKG
Test Reason : 

Blood Pressure : ***/*** mmHG

Vent. Rate : 087 BPM     Atrial Rate : 087 BPM

   P-R Int : 130 ms          QRS Dur : 100 ms

    QT Int : 380 ms       P-R-T Axes : 036 -28 051 degrees

   QTc Int : 457 ms

 

Normal sinus rhythm

Normal ECG

 

Confirmed by ALONZO MOORE ([id number]),  HEATHER SILVA ([id number]) on 8/18/2018 11:50:01 AM

 

Referred By:             Confirmed By:ALONZO MOORE

## 2018-08-24 ENCOUNTER — HOSPITAL ENCOUNTER (INPATIENT)
Dept: HOSPITAL 92 - ERS | Age: 83
LOS: 7 days | Discharge: NURSING, REHAB OR LONG-TERM CARE | DRG: 177 | End: 2018-08-31
Attending: INTERNAL MEDICINE | Admitting: INTERNAL MEDICINE
Payer: MEDICARE

## 2018-08-24 VITALS — BODY MASS INDEX: 31.7 KG/M2

## 2018-08-24 DIAGNOSIS — Z90.49: ICD-10-CM

## 2018-08-24 DIAGNOSIS — M54.9: ICD-10-CM

## 2018-08-24 DIAGNOSIS — F41.9: ICD-10-CM

## 2018-08-24 DIAGNOSIS — J69.0: Primary | ICD-10-CM

## 2018-08-24 DIAGNOSIS — I50.32: ICD-10-CM

## 2018-08-24 DIAGNOSIS — Z86.73: ICD-10-CM

## 2018-08-24 DIAGNOSIS — I13.0: ICD-10-CM

## 2018-08-24 DIAGNOSIS — E87.6: ICD-10-CM

## 2018-08-24 DIAGNOSIS — E78.5: ICD-10-CM

## 2018-08-24 DIAGNOSIS — Z79.82: ICD-10-CM

## 2018-08-24 DIAGNOSIS — G89.29: ICD-10-CM

## 2018-08-24 DIAGNOSIS — E66.9: ICD-10-CM

## 2018-08-24 DIAGNOSIS — G30.9: ICD-10-CM

## 2018-08-24 DIAGNOSIS — F02.80: ICD-10-CM

## 2018-08-24 DIAGNOSIS — Z66: ICD-10-CM

## 2018-08-24 DIAGNOSIS — Z99.3: ICD-10-CM

## 2018-08-24 DIAGNOSIS — J96.21: ICD-10-CM

## 2018-08-24 DIAGNOSIS — Z90.710: ICD-10-CM

## 2018-08-24 DIAGNOSIS — Z99.81: ICD-10-CM

## 2018-08-24 DIAGNOSIS — F32.9: ICD-10-CM

## 2018-08-24 DIAGNOSIS — Z96.653: ICD-10-CM

## 2018-08-24 DIAGNOSIS — N18.2: ICD-10-CM

## 2018-08-24 DIAGNOSIS — G47.00: ICD-10-CM

## 2018-08-24 DIAGNOSIS — J44.9: ICD-10-CM

## 2018-08-24 DIAGNOSIS — K21.9: ICD-10-CM

## 2018-08-24 LAB
ALBUMIN SERPL BCG-MCNC: 3.2 G/DL (ref 3.4–4.8)
ALP SERPL-CCNC: 100 U/L (ref 40–150)
ALT SERPL W P-5'-P-CCNC: 11 U/L (ref 8–55)
ANION GAP SERPL CALC-SCNC: 14 MMOL/L (ref 10–20)
AST SERPL-CCNC: 20 U/L (ref 5–34)
BASOPHILS # BLD AUTO: 0.1 THOU/UL (ref 0–0.2)
BASOPHILS NFR BLD AUTO: 0.8 % (ref 0–1)
BILIRUB SERPL-MCNC: 0.2 MG/DL (ref 0.2–1.2)
BUN SERPL-MCNC: 17 MG/DL (ref 9.8–20.1)
CALCIUM SERPL-MCNC: 9 MG/DL (ref 7.8–10.44)
CHLORIDE SERPL-SCNC: 97 MMOL/L (ref 98–107)
CK MB SERPL-MCNC: 1 NG/ML (ref 0–6.6)
CK SERPL-CCNC: 19 U/L (ref 29–168)
CO2 SERPL-SCNC: 31 MMOL/L (ref 23–31)
CREAT CL PREDICTED SERPL C-G-VRATE: 0 ML/MIN (ref 70–130)
EOSINOPHIL # BLD AUTO: 1 THOU/UL (ref 0–0.7)
EOSINOPHIL NFR BLD AUTO: 11.5 % (ref 0–10)
GLOBULIN SER CALC-MCNC: 4.9 G/DL (ref 2.4–3.5)
GLUCOSE SERPL-MCNC: 88 MG/DL (ref 83–110)
HGB BLD-MCNC: 14.8 G/DL (ref 12–16)
LIPASE SERPL-CCNC: 18 U/L (ref 8–78)
LYMPHOCYTES # BLD: 1.6 THOU/UL (ref 1.2–3.4)
LYMPHOCYTES NFR BLD AUTO: 18.6 % (ref 21–51)
MCH RBC QN AUTO: 31.3 PG (ref 27–31)
MCV RBC AUTO: 90.6 FL (ref 78–98)
MONOCYTES # BLD AUTO: 0.6 THOU/UL (ref 0.11–0.59)
MONOCYTES NFR BLD AUTO: 6.3 % (ref 0–10)
NEUTROPHILS # BLD AUTO: 5.5 THOU/UL (ref 1.4–6.5)
NEUTROPHILS NFR BLD AUTO: 62.8 % (ref 42–75)
PLATELET # BLD AUTO: 365 THOU/UL (ref 130–400)
POTASSIUM SERPL-SCNC: 3.5 MMOL/L (ref 3.5–5.1)
RBC # BLD AUTO: 4.74 MILL/UL (ref 4.2–5.4)
SODIUM SERPL-SCNC: 138 MMOL/L (ref 136–145)
TROPONIN I SERPL DL<=0.01 NG/ML-MCNC: 0.02 NG/ML (ref ?–0.03)
WBC # BLD AUTO: 8.8 THOU/UL (ref 4.8–10.8)

## 2018-08-24 PROCEDURE — 71045 X-RAY EXAM CHEST 1 VIEW: CPT

## 2018-08-24 PROCEDURE — 96367 TX/PROPH/DG ADDL SEQ IV INF: CPT

## 2018-08-24 PROCEDURE — 84484 ASSAY OF TROPONIN QUANT: CPT

## 2018-08-24 PROCEDURE — 82550 ASSAY OF CK (CPK): CPT

## 2018-08-24 PROCEDURE — 94760 N-INVAS EAR/PLS OXIMETRY 1: CPT

## 2018-08-24 PROCEDURE — 83735 ASSAY OF MAGNESIUM: CPT

## 2018-08-24 PROCEDURE — 83605 ASSAY OF LACTIC ACID: CPT

## 2018-08-24 PROCEDURE — A4216 STERILE WATER/SALINE, 10 ML: HCPCS

## 2018-08-24 PROCEDURE — 96365 THER/PROPH/DIAG IV INF INIT: CPT

## 2018-08-24 PROCEDURE — 96366 THER/PROPH/DIAG IV INF ADDON: CPT

## 2018-08-24 PROCEDURE — 90670 PCV13 VACCINE IM: CPT

## 2018-08-24 PROCEDURE — 87040 BLOOD CULTURE FOR BACTERIA: CPT

## 2018-08-24 PROCEDURE — 85025 COMPLETE CBC W/AUTO DIFF WBC: CPT

## 2018-08-24 PROCEDURE — 71046 X-RAY EXAM CHEST 2 VIEWS: CPT

## 2018-08-24 PROCEDURE — 80053 COMPREHEN METABOLIC PANEL: CPT

## 2018-08-24 PROCEDURE — 36415 COLL VENOUS BLD VENIPUNCTURE: CPT

## 2018-08-24 PROCEDURE — 83690 ASSAY OF LIPASE: CPT

## 2018-08-24 PROCEDURE — 90471 IMMUNIZATION ADMIN: CPT

## 2018-08-24 PROCEDURE — 74019 RADEX ABDOMEN 2 VIEWS: CPT

## 2018-08-24 PROCEDURE — G0009 ADMIN PNEUMOCOCCAL VACCINE: HCPCS

## 2018-08-24 PROCEDURE — 94640 AIRWAY INHALATION TREATMENT: CPT

## 2018-08-24 PROCEDURE — 82553 CREATINE MB FRACTION: CPT

## 2018-08-24 PROCEDURE — 83880 ASSAY OF NATRIURETIC PEPTIDE: CPT

## 2018-08-24 PROCEDURE — 80069 RENAL FUNCTION PANEL: CPT

## 2018-08-24 PROCEDURE — 93005 ELECTROCARDIOGRAM TRACING: CPT

## 2018-08-24 PROCEDURE — 80048 BASIC METABOLIC PNL TOTAL CA: CPT

## 2018-08-24 PROCEDURE — 74018 RADEX ABDOMEN 1 VIEW: CPT

## 2018-08-24 RX ADMIN — Medication SCH ML: at 23:33

## 2018-08-24 NOTE — RAD
PORTABLE CHEST 1 VIEW:

 

DATE: 8/24/18.

TIME: 2:31 p.m.

 

HISTORY: 

Dyspnea.

 

FINDINGS: 

Comparison is made with the exam dated 8/7/18.

 

The heart size is borderline.  There are bilateral infiltrates, right greater than left.  No pneumoth
oraces or large effusions are seen.  There is pulmonary vascular congestion as well.

 

POS: [facility]

## 2018-08-24 NOTE — HP
CHIEF COMPLAINT:  Shortness of breath.

 

HISTORIAN:  The patient and electronic medical records reliable.

 

HISTORY OF PRESENT ILLNESS:  This is an 83-year-old female with past medical 
history of hypertension, chronic back pain, insomnia, Alzheimer's disease, CHF, 
hyperlipidemia who is presenting with shortness of breath and cough.  Per the 
patient, she has been having cough for the past 2 weeks.  The patient states 
that she is from nursing home [place] and this morning, she was having 
shortness of breath and cough.  Therefore, patient was transferred to our 
emergency room to be examined.  The patient also complained of associated 
symptoms of nausea.  Per the electronic medical records, patient was seen 
yesterday and an x-ray was done.  In the ED, patient was given Levaquin, 
vancomycin intravenously and Zosyn.  X-ray that was done in the ED showed 
diffuse right-sided infiltrate.  Patient denied any chest pain, palpitations, 
abdominal pain, fever, chills, headaches, dizziness, urinary and bowel 
incontinence.  Patient has not had any recent travel history.  The patient 
states that she has been clean for 2 weeks and not been sick recently.

 

REVIEW OF SYSTEMS:  Positive for shortness of breath and cough and negative for 
all other review of systems.

 

PAST MEDICAL HISTORY:  Hypertension, chronic back pain, insomnia, Alzheimer's 
disease, CHF, reflux disease, wheelchair bound, hyperlipidemia, GERD, major 
depressive disorder.

 

PAST SURGICAL HISTORY:  Hysterectomy, appendectomy, cholecystectomy, left lower 
fracture with surgical intervention, bilateral knee replacement.

 

FAMILY HISTORY:  Noncontributory to the patient's visit.

 

PSYCHIATRIC HISTORY:  Anxiety and major depressive disorder.

 

SOCIAL HISTORY:  Patient lives in a nursing home at [place].  Patient denies 
any alcohol use.  The patient denies any drug use.  The patient denies any 
smoking history.

 

ALLERGIES:  No known drug allergies.



CURRENT MEDICATION: Memantine 10 mg BID; Fluoxetine 40mg BID; Donepezil 10mg ; 
ASA 81mg ; Trazodone 50mg; Ultram 50mg ; Tramadol 100mg; Prednisone 20mg;  
Miralax, Omeprazole 20mg; Trileptal 300mg; Magnesium 30 ml; Duonebs; Furosemide 
20mg daily; Vytorin 1 tablet; Diazepam 5mg daily; tylenol with codeine #3; 
Acetaminophen. 

 

PHYSICAL EXAMINATION:

VITAL SIGNS:  On admission, patient's temperature was 98.2, pulse of 86, blood 
pressure of 114/82, respiratory rate of 26, O2 sat is 93% on 5 liter nasal 
cannula.

GENERAL APPEARANCE:  The patient is lying on her right side in bed with nasal 
cannula in place, does not appear to be in any distress, able to speak in full 
sentences.

HEENT:  Normocephalic, atraumatic.  Pupils are equally round and reactive to 
light.  Extraocular muscles are intact.  There is no scleral icterus.  Mucous 
membranes appear dry.

NECK:  No JVD.  Trachea is at the midline.  No tracheal deviation.

RESPIRATORY:  The patient has bilateral wheezes at the anterior lung fields and 
posterior lung fields.  There are mild crackles at the lower bases bilaterally.

CARDIOVASCULAR:  Positive S1, S2, regular rate and rhythm, no murmurs, no 
gallops, no rubs appreciated.

ABDOMEN:  Obese abdomen, soft, nontender, nondistended.  No ecchymosis.  No 
peritoneal signs.

EXTREMITIES:  A 5/5 upper extremity strength with good pulses bilaterally, 5/5 
lower extremity strength with good pulses bilaterally.  No edema noted.  
Patient has onychomycosis at the toenails.

PSYCHIATRIC:  Normal affect.  Alert and oriented x3, does not appear to be in 
distress.

SKIN:  Warm, dry, and intact.

 

LABORATORY DATA AND IMAGING DATA:  Lactic acid was 1.5.  X-ray of the chest 
showed bilateral infiltrates, right greater than left.  No pneumothorax or 
large effusions are seen.  BNP was 46.7.  Troponin x1 is 0.023.  CK-MB is 1.0.  
Lipase is 18.  CK is 19.  Electrolytes; sodium is 138, potassium 3.5, chloride 
is 97, anion gap of 14, BUN 17, creatinine 0.84.  CBC:  White count is 8.8, 
hemoglobin is 14.8, hematocrit is 42.9, MCV is 90.6, platelet is 365.

 

ASSESSMENT AND PLAN:

1.  This is an 83-year-old female being admitted for hypoxemic respiratory 
failure most likely secondary to healthcare associated pneumonia.  At this point
, we have started the patient on Levaquin.  We have consulted Pulmonology.  The 
patient is having wheezes bilaterally at the anterior and posterior lung 
fields.  We will give patient Solu-Medrol 40 q.8.  We will do DuoNeb 
treatments.  We will consider gentle hydration for the patient.  We will follow 
up CBC, CMP in the morning.  We will get another chest x-ray in the morning.  
We will follow pulmonologist's recommendations.

2.  Hypertension.  Currently, the patient is hypotensive.  We will start gentle 
hydration on the patient.  We will follow up on blood pressure closely.

3.  Hyperlipidemia.  We will continue patient on her home medications.

4.  Chronic back pain, currently stable.  The patient is not asking for any 
pain medication; however, we will start patient on her home medications.

4.  Anxiety and major depressive disorders.  We will continue the patient on 
home medication.  Currently, the patient is stable.

5.  Alzheimer's disease.  Currently, patient is awake, alert, oriented, we will 
continue patient on her home medication.

 

MTDD

## 2018-08-25 LAB
ALBUMIN SERPL BCG-MCNC: 2.8 G/DL (ref 3.4–4.8)
ALP SERPL-CCNC: 87 U/L (ref 40–150)
ALT SERPL W P-5'-P-CCNC: 10 U/L (ref 8–55)
ANION GAP SERPL CALC-SCNC: 13 MMOL/L (ref 10–20)
AST SERPL-CCNC: 19 U/L (ref 5–34)
BASOPHILS # BLD AUTO: 0.1 THOU/UL (ref 0–0.2)
BASOPHILS NFR BLD AUTO: 0.7 % (ref 0–1)
BILIRUB SERPL-MCNC: 0.2 MG/DL (ref 0.2–1.2)
BUN SERPL-MCNC: 15 MG/DL (ref 9.8–20.1)
CALCIUM SERPL-MCNC: 8.7 MG/DL (ref 7.8–10.44)
CHLORIDE SERPL-SCNC: 100 MMOL/L (ref 98–107)
CO2 SERPL-SCNC: 27 MMOL/L (ref 23–31)
CREAT CL PREDICTED SERPL C-G-VRATE: 80 ML/MIN (ref 70–130)
EOSINOPHIL # BLD AUTO: 1 THOU/UL (ref 0–0.7)
EOSINOPHIL NFR BLD AUTO: 12.1 % (ref 0–10)
GLOBULIN SER CALC-MCNC: 4.3 G/DL (ref 2.4–3.5)
GLUCOSE SERPL-MCNC: 94 MG/DL (ref 83–110)
HGB BLD-MCNC: 13 G/DL (ref 12–16)
LYMPHOCYTES # BLD: 1.3 THOU/UL (ref 1.2–3.4)
LYMPHOCYTES NFR BLD AUTO: 16.6 % (ref 21–51)
MCH RBC QN AUTO: 30.1 PG (ref 27–31)
MCV RBC AUTO: 90.5 FL (ref 78–98)
MONOCYTES # BLD AUTO: 0.6 THOU/UL (ref 0.11–0.59)
MONOCYTES NFR BLD AUTO: 7.5 % (ref 0–10)
NEUTROPHILS # BLD AUTO: 5 THOU/UL (ref 1.4–6.5)
NEUTROPHILS NFR BLD AUTO: 63 % (ref 42–75)
PLATELET # BLD AUTO: 308 THOU/UL (ref 130–400)
POTASSIUM SERPL-SCNC: 3.2 MMOL/L (ref 3.5–5.1)
RBC # BLD AUTO: 4.31 MILL/UL (ref 4.2–5.4)
SODIUM SERPL-SCNC: 137 MMOL/L (ref 136–145)
WBC # BLD AUTO: 7.9 THOU/UL (ref 4.8–10.8)

## 2018-08-25 RX ADMIN — Medication SCH ML: at 21:03

## 2018-08-25 RX ADMIN — Medication SCH ML: at 08:29

## 2018-08-25 NOTE — RAD
PORTABLE CHEST:

 

HISTORY: 

Shortness of breath.

 

COMPARISON: 

8/24/18.

 

FINDINGS: 

Diffuse right lung infiltrate is again noted.  Left lung remains aerated and clear of confluent infil
trate, although interstitial markings in the left lung remain prominent and stable.

 

IMPRESSION: 

No significant interval change from yesterday.

 

POS: [facility]

## 2018-08-25 NOTE — PDOC.PN
- Subjective


Encounter Start Date: 08/25/18


Encounter Start Time: 15:00





patient seen and examined by me. NAD





- Objective


MAR Reviewed: Yes


Vital Signs & Weight: 


 Vital Signs (12 hours)











  Temp Pulse Resp BP Pulse Ox


 


 08/25/18 14:29      93 L


 


 08/25/18 14:28   76  24 H  


 


 08/25/18 11:25  97.9 F  78  20  118/57 L  93 L


 


 08/25/18 07:51  98.6 F  74  18  96/51 L  94 L


 


 08/25/18 07:21  98.7 F  77  18   93 L


 


 08/25/18 05:36  98.7 F  77  18  106/54 L  95








 Weight











Weight                         208 lb 12.8 oz














I&O: 


 











 08/24/18 08/25/18 08/26/18





 06:59 06:59 06:59


 


Intake Total   300


 


Balance   300











Result Diagrams: 


 08/25/18 05:15





 08/25/18 05:15





Phys Exam





- Physical Examination


Constitutional: NAD


HEENT: PERRLA, moist MMs


Neck: no nodes, no JVD, supple


Respiratory: no wheezing, no rales


Cardiovascular: RRR, no significant murmur, no rub


Gastrointestinal: soft, non-tender, no distention, positive bowel sounds


Musculoskeletal: no edema, pulses present


Neurological: non-focal, normal sensation, moves all 4 limbs


Psychiatric: normal affect


Skin: no rash, normal turgor, cap refill <2 seconds





Dx/Plan


(1) PNA (pneumonia)


Code(s): J18.9 - PNEUMONIA, UNSPECIFIED ORGANISM   Status: Acute   Comment: 

imaging stating there must be some infiltrate. Consulted Pulm. Started patient 

on levaquin. Pulm added cefepime. solumedrol for wheeze. duonebs.    





(2) Anxiety and depression


Code(s): F41.8 - OTHER SPECIFIED ANXIETY DISORDERS   Status: Chronic   





(3) Chronic respiratory failure with hypoxia, on home O2 therapy


Code(s): J96.11 - CHRONIC RESPIRATORY FAILURE WITH HYPOXIA; Z99.81 - DEPENDENCE 

ON SUPPLEMENTAL OXYGEN   Status: Chronic   





(4) Dementia


Code(s): F03.90 - UNSPECIFIED DEMENTIA WITHOUT BEHAVIORAL DISTURBANCE   Status: 

Chronic   


Qualifiers: 


   Dementia type: Alzheimer's disease 





(5) Dyslipidemia


Code(s): E78.5 - HYPERLIPIDEMIA, UNSPECIFIED   Status: Chronic   





(6) GERD (gastroesophageal reflux disease)


Code(s): K21.9 - GASTRO-ESOPHAGEAL REFLUX DISEASE WITHOUT ESOPHAGITIS   Status: 

Chronic   


Qualifiers: 


   Esophagitis presence: esophagitis presence not specified   Qualified Code(s)

: K21.9 - Gastro-esophageal reflux disease without esophagitis   





(7) H/O: CVA (cerebrovascular accident)


Code(s): Z86.73 - PRSNL HX OF TIA (TIA), AND CEREB INFRC W/O RESID DEFICITS   

Status: Chronic   





(8) HTN (hypertension)


Code(s): I10 - ESSENTIAL (PRIMARY) HYPERTENSION   Status: Chronic   


Qualifiers: 


   Hypertension type: essential hypertension   Qualified Code(s): I10 - 

Essential (primary) hypertension   





(9) Obesity (BMI 30-39.9)


Code(s): E66.9 - OBESITY, UNSPECIFIED   Status: Chronic   





- Plan





* .








Review of Systems





- Review of Systems


Constitutional: negative: fever, chills, sweats, weakness, malaise, other


Eyes: negative: Pain, Vision Change, Conjunctivae Inflammation, Eyelid 

Inflammation, Redness, Other


ENT: negative: Ear Pain, Ear Discharge, Nose Pain, Nose Discharge, Nose 

Congestion, Mouth Pain, Mouth Swelling, Throat Pain, Throat Swelling, Other


Respiratory: negative: Cough, Dry, Shortness of Breath, Hemoptysis, SOB with 

Excertion, Pleuritic Pain, Sputum, Wheezing


Cardiovascular: negative: chest pain, palpitations, orthopnea, paroxysmal 

nocturnal dyspnea, edema, light headedness, other


Gastrointestinal: negative: Nausea, Vomiting, Abdominal Pain, Diarrhea, 

Constipation, Melena, Hematochezia, Other


Genitourinary: negative: Dysuria, Frequency, Incontinence, Hematuria, Retention

, Other


Musculoskeletal: negative: Neck Pain, Shoulder Pain, Arm Pain, Back Pain, Hand 

Pain, Leg Pain, Foot Pain, Other


Skin: negative: Rash, Lesions, Aaron, Bruising, Other


Neurological: negative: Weakness, Numbness, Incoordination, Change in Speech, 

Confusion, Seizures, Other





- Medications/Allergies


Allergies/Adverse Reactions: 


 Allergies











Allergy/AdvReac Type Severity Reaction Status Date / Time


 


No Known Allergies Allergy   Verified 08/24/18 23:55











Medications: 


 Current Medications





Acetaminophen (Tylenol)  650 mg PO Q4H PRN


   PRN Reason: Headache/Fever or Pain


   Last Admin: 08/25/18 13:20 Dose:  650 mg


Albuterol/Ipratropium (Duoneb)  3 ml NEB [id number] PRN


   PRN Reason: SOB &/or Wheezing


Albuterol/Ipratropium (Duoneb)  3 ml NEB [id number] On license of [facility]


   Last Admin: 08/25/18 14:28 Dose:  3 ml


Aspirin (Aspirin Chewable)  81 mg PO HS On license of [facility]


Bisacodyl (Dulcolax)  10 mg PO DAILYPRN PRN


   PRN Reason: Constipation


Donepezil HCl (Aricept)  10 mg PO HS On license of [facility]


Enoxaparin Sodium (Lovenox)  40 mg SC 0900 On license of [facility]


   Last Admin: 08/25/18 08:28 Dose:  40 mg


Famotidine (Pepcid)  20 mg PO BID On license of [facility]


   Last Admin: 08/25/18 08:28 Dose:  20 mg


Fluoxetine HCl (Prozac)  40 mg PO BID On license of [facility]


   Last Admin: 08/25/18 08:28 Dose:  40 mg


Furosemide (Lasix)  20 mg PO DAILY On license of [facility]


   Last Admin: 08/25/18 09:35 Dose:  20 mg


Levofloxacin 750 mg/ Device  150 mls @ 100 mls/hr IVPB Q24HR On license of [facility]


Cefepime HCl 1 gm/ Sodium (Chloride)  100 mls @ 200 mls/hr IVPB 0100,1300 On license of [facility]


   Last Admin: 08/25/18 13:20 Dose:  100 mls


Lactulose (Lactulose)  20 gm PO DAILYPRN PRN


   PRN Reason: Constipation


Memantine (Namenda)  10 mg PO BID On license of [facility]


   Last Admin: 08/25/18 08:28 Dose:  10 mg


Methylprednisolone Sodium Succinate (Solu-Medrol)  40 mg IM Q8HR On license of [facility]


   Last Admin: 08/25/18 13:20 Dose:  40 mg


Ondansetron HCl (Zofran Odt)  4 mg PO Q6H PRN


   PRN Reason: Nausea/Vomiting


Ondansetron HCl (Zofran)  4 mg IVP Q6H PRN


   PRN Reason: Nausea/Vomiting


Oxcarbazepine (Trileptal)  300 mg PO BID On license of [facility]


   Last Admin: 08/25/18 09:35 Dose:  300 mg


Senna (Senokot)  2 tab PO HSPRN PRN


   PRN Reason: Constipation


Sodium Chloride (Flush - Normal Saline)  10 ml IVF Q12HR On license of [facility]


   Last Admin: 08/25/18 08:29 Dose:  10 ml


Sodium Chloride (Flush - Normal Saline)  10 ml IVF PRN PRN


   PRN Reason: Saline Flush


Tramadol HCl (Ultram)  50 mg PO QID PRN


   PRN Reason: Moderate Pain (4-6)


Trazodone HCl (Desyrel)  50 mg PO HS NGOC

## 2018-08-25 NOTE — CON
DATE OF CONSULTATION:  08/25/2018

 

HISTORY OF PRESENT ILLNESS:  This is an 83-year-old demented patient from the nursing home, who prese
nted with hypoxemia and associated shortness of breath.

 

We are being consulted regarding her pulmonary status.

 

She has been here numerous times with the above-mentioned problems with respiratory failure and pneum
onia.  She had an echocardiogram done in January of this year, which showed normal EF.  Findings sugg
estive of diastolic dysfunction.

 

This morning, she says she is feeling better.  Denies any coughing or wheezing.

 

EXTENSIVE PAST MEDICAL HISTORY:  Recurrent pneumonia, hypertension, dementia, lipidemia, CVA, reflux.


 

PAST SURGICAL HISTORY:  Cholecystectomy, hysterectomy, bilateral knee replacement, left leg swelling.


 

SOCIAL HISTORY:  Alcohol:  None.  Tobacco:  None.

 

MEDICATIONS:  Medicine from the nursing home:  Trazodone 50, Trileptal 300 twice a day, memantine 10 
twice a day, nebulizer 4 times a day, Lasix 20 a day, Prozac 40, Aricept 10 a day, Valium 10/2.5, and
 aspirin.

 

Since admission, she was started on broad-spectrum antibiotics.

 

REVIEW OF SYSTEMS:  Otherwise, 10-point negative.

 

PHYSICAL EXAMINATION:

GENERAL:  She is awake, alert, responsive, appears to be in no distress.

VITAL SIGNS:  Sats are 93% on 2 liters, respirations 20, temperature 97, pulse 78, blood pressure 118
/57.

CHEST:  Extensive bilateral rhonchi and crackles, right greater than left.

CARDIAC:  Normal S1, S2.  No gallops.

ABDOMEN:  Soft, no masses.

 

LABORATORY:  White count 7000, H and H 13 and 39, platelet count 308.  Electrolytes are normal.

 

BNP is 46.  Albumin is 2.8.

 

IMPRESSION:

1.  Abnormal x-ray suggestive of bilateral bronchopneumonia, possibly aspiration.

2.  Diastolic dysfunction.

3.  Dementia, advanced age.

 

PLAN:  At this stage, I have added Maxipime for aspiration pneumonia.  Continue steroids, neb treatme
nts.  We will follow.

 

Consultation note, 70 minutes of which 50% spent in direct patient care.

## 2018-08-25 NOTE — PDOC.EVN
Event Note





- Event Note


Event Note: 





called re: patient's code status


pt has an OOH DNR paperwork and some sort of MPOA paperwork signed on the chart 

from 2015 but does not appear to have been updated since then


d/w bedside nursing that the OOH DNR applies strictly on an out of hospital 

basis and that given the patient's cognitive impairment, would highly recommend 

a palliative care consult (placed) and discussion with patient's MPOA asap 

regarding code status wishes. 





pt also c/o of insomnia - requesting confirmation of home dose of sleep regimen

## 2018-08-26 RX ADMIN — Medication SCH ML: at 19:50

## 2018-08-26 RX ADMIN — Medication SCH ML: at 08:59

## 2018-08-26 NOTE — RAD
PORTABLE AP CHEST X-RAY:

 

08/26/2018

 

HISTORY:

Shortness of breath.

 

COMPARISON:

08/25/2018

 

FINDINGS:

Again noted are diffuse increased interstitial opacities throughout the right lung with increased int
erstitial opacities in the right mid lung zone, at the left lung base.  Findings are similar to the p
rior exam.  The cardiac silhouette is within normal limits.  No other interval change.

 

IMPRESSION:

Stable chest with bilateral interstitial opacities, much greater on the right, which may be related t
o an infectious process.  An atypical infectious process is a possibility.  Findings could potentiall
y represent atypical pneumonia.

 

POS: [facility]

## 2018-08-26 NOTE — RAD
AP ABDOMINAL RADIOGRAPH:

 

08/26/2018

 

HISTORY:

Abdominal pain.

 

COMPARISON:

08/07/2018

 

FINDINGS:

The bowel gas pattern is overall nonspecific.  There is gaseous distention of loops of small bowel.  
There are radiopaque densities overlying the pelvic, which probably represent ingested material withi
n loops of bowel.  Calcifications also overly the pelvis.  Radiopaque density also overlies the left 
upper quadrant.  These radiopaque densities were also seen on prior exam, and prior CT examination di
d demonstrate ingested material within the bowel and could be related to medication.  Post surgical c
hanges of the lumbar spine are again noted, with degenerative changes again seen in the spine.  No ot
her interval change.

 

IMPRESSION:

1.  Nonspecific bowel gas pattern.

 

2.  Scattered, rounded areas of increased density within the abdomen and pelvis, which may be related
 to ingested material within bowel.

 

POS: [facility]

## 2018-08-26 NOTE — PRG
DATE OF SERVICE:  08/26/2018

 

SUBJECTIVE:  This morning she is coughing, still short of breath.

 

OBJECTIVE:

VITAL SIGNS:  Sats are 92 on 4 liters, temperature is 97, respiration 20, pulse rate 82, blood pressu
re 112/55.

CHEST:  Decreased breath sounds, no wheezing.

CARDIAC:  Normal S1, S2.

ABDOMEN:  Soft, no masses.

 

IMPRESSION:

1.  Right-sided pneumonia.

2.  Chronic obstructive pulmonary disease.

3.  Severe deconditioning.

4.  Dementia.

 

She is not ready to go home.

 

Continue supportive care, antibiotics, steroids.

 

We will follow.

## 2018-08-26 NOTE — PDOC.PN
- Subjective


Encounter Start Date: 08/26/18


Encounter Start Time: 11:30





Patient seen and examined for Pneumonia. No new complaints. No overnight events





- Objective


MAR Reviewed: Yes


Vital Signs & Weight: 


 Vital Signs (12 hours)











  Temp Pulse Resp BP Pulse Ox


 


 08/26/18 19:00  98.2 F  97  20  117/71  95


 


 08/26/18 18:25   90  24 H   91 L


 


 08/26/18 16:07  98.0 F  93  36 H  107/67  93 L


 


 08/26/18 15:41    40 H   95


 


 08/26/18 14:25  97.6 F  97  42 H   92 L


 


 08/26/18 13:50  97.6 F  97  42 H  126/77  92 L


 


 08/26/18 13:13   78  28 H   93 L


 


 08/26/18 12:00  98.1 F  96  20  120/57 L  92 L








 Weight











Weight                         208 lb 12.8 oz














I&O: 


 











 08/25/18 08/26/18 08/27/18





 06:59 06:59 06:59


 


Intake Total  1070 600


 


Balance  1070 600











Result Diagrams: 


 08/25/18 05:15





 08/25/18 05:15


EKG Reviewed by me: Yes (Tele SR)





Phys Exam





- Physical Examination


Constitutional: NAD


Respiratory: no wheezing


Bibasilar rales with scat rhonchi


Cardiovascular: RRR, no rub


Gastrointestinal: soft, non-tender, positive bowel sounds


Musculoskeletal: no edema


Neurological: moves all 4 limbs





Dx/Plan





- Plan


DVT proph w/lovenox, DVT proph w/SCDs





IMPRESSION:


1. Acute hypoxic resp failure due to HCAP ?Pneumococcal


2. Hypokalemia


3. Obesity BMI 31.7


4. CKD 2 / Dementia


5. Other issues per previous notes





PLAN:


Cont Cefepime/Levaquin with Steroids


Replace Potassium


Nebs Q6h


Cont other meds as below


AM labs


Transfer to medical








Review of Systems





- Review of Systems


Respiratory: negative: Cough, Dry, Shortness of Breath, Hemoptysis, SOB with 

Excertion, Pleuritic Pain, Sputum, Wheezing


Cardiovascular: negative: chest pain, palpitations, orthopnea, paroxysmal 

nocturnal dyspnea, edema, light headedness, other





- Medications/Allergies


Allergies/Adverse Reactions: 


 Allergies











Allergy/AdvReac Type Severity Reaction Status Date / Time


 


No Known Allergies Allergy   Verified 08/24/18 23:55











Medications: 


 Current Medications





Acetaminophen (Tylenol)  650 mg PO Q4H PRN


   PRN Reason: Headache/Fever or Pain


   Last Admin: 08/25/18 13:20 Dose:  650 mg


Albuterol/Ipratropium (Duoneb)  3 ml NEB [id number] PRN


   PRN Reason: SOB &/or Wheezing


Albuterol/Ipratropium (Duoneb)  3 ml NEB [id number] [facility]


   Last Admin: 08/26/18 18:25 Dose:  3 ml


Aspirin (Aspirin Chewable)  81 mg PO HS [facility]


   Last Admin: 08/26/18 19:48 Dose:  81 mg


Bisacodyl (Dulcolax)  10 mg PO DAILYPRN PRN


   PRN Reason: Constipation


Donepezil HCl (Aricept)  10 mg PO HS [facility]


   Last Admin: 08/26/18 19:48 Dose:  10 mg


Enoxaparin Sodium (Lovenox)  40 mg SC 0900 [facility]


   Last Admin: 08/26/18 08:57 Dose:  40 mg


Famotidine (Pepcid)  20 mg PO BID [facility]


   Last Admin: 08/26/18 19:50 Dose:  20 mg


Fluoxetine HCl (Prozac)  40 mg PO BID [facility]


   Last Admin: 08/26/18 19:49 Dose:  40 mg


Furosemide (Lasix)  20 mg PO DAILY [facility]


   Last Admin: 08/26/18 08:58 Dose:  20 mg


Levofloxacin 750 mg/ Device  150 mls @ 100 mls/hr IVPB Q24HR [facility]


   Last Admin: 08/26/18 19:48 Dose:  150 mls


Cefepime HCl 1 gm/ Sodium (Chloride)  100 mls @ 200 mls/hr IVPB 0100,1300 [facility]


   Last Admin: 08/26/18 08:59 Dose:  100 mls


Lactulose (Lactulose)  20 gm PO DAILYPRN PRN


   PRN Reason: Constipation


   Last Admin: 08/26/18 17:47 Dose:  20 gm


Memantine (Namenda)  10 mg PO BID [facility]


   Last Admin: 08/26/18 19:49 Dose:  10 mg


Methylprednisolone Sodium Succinate (Solu-Medrol)  40 mg IVP Q8HR [facility]


   Last Admin: 08/26/18 19:50 Dose:  40 mg


Ondansetron HCl (Zofran Odt)  4 mg PO Q6H PRN


   PRN Reason: Nausea/Vomiting


Ondansetron HCl (Zofran)  4 mg IVP Q6H PRN


   PRN Reason: Nausea/Vomiting


Oxcarbazepine (Trileptal)  300 mg PO BID [facility]


   Last Admin: 08/26/18 19:49 Dose:  300 mg


Potassium Chloride (Klor-Con 10)  10 meq PO BID-[facility]


Saccharomyces Boulardii (Florastor)  250 mg PO DAILY [facility]


   Last Admin: 08/26/18 08:58 Dose:  250 mg


Senna (Senokot)  2 tab PO HSPRN PRN


   PRN Reason: Constipation


   Last Admin: 08/26/18 17:50 Dose:  2 tab


Sodium Chloride (Flush - Normal Saline)  10 ml IVF Q12HR [facility]


   Last Admin: 08/26/18 19:50 Dose:  10 ml


Sodium Chloride (Flush - Normal Saline)  10 ml IVF PRN PRN


   PRN Reason: Saline Flush


Tramadol HCl (Ultram)  50 mg PO QID PRN


   PRN Reason: Moderate Pain (4-6)


Trazodone HCl (Desyrel)  50 mg PO [facility]


   Last Admin: 08/26/18 19:48 Dose:  50 mg

## 2018-08-27 LAB
ANION GAP SERPL CALC-SCNC: 15 MMOL/L (ref 10–20)
ANION GAP SERPL CALC-SCNC: 18 MMOL/L (ref 10–20)
BASOPHILS # BLD AUTO: 0 THOU/UL (ref 0–0.2)
BASOPHILS NFR BLD AUTO: 0.2 % (ref 0–1)
BUN SERPL-MCNC: (no result) MG/DL (ref 9.8–20.1)
BUN SERPL-MCNC: 14 MG/DL (ref 9.8–20.1)
CALCIUM SERPL-MCNC: 8.8 MG/DL (ref 7.8–10.44)
CALCIUM SERPL-MCNC: 8.9 MG/DL (ref 7.8–10.44)
CHLORIDE SERPL-SCNC: 103 MMOL/L (ref 98–107)
CHLORIDE SERPL-SCNC: 106 MMOL/L (ref 98–107)
CO2 SERPL-SCNC: 17 MMOL/L (ref 23–31)
CO2 SERPL-SCNC: 25 MMOL/L (ref 23–31)
CREAT CL PREDICTED SERPL C-G-VRATE: 135 ML/MIN (ref 70–130)
CREAT CL PREDICTED SERPL C-G-VRATE: 83 ML/MIN (ref 70–130)
EOSINOPHIL # BLD AUTO: 0 THOU/UL (ref 0–0.7)
EOSINOPHIL NFR BLD AUTO: 0.3 % (ref 0–10)
GLUCOSE SERPL-MCNC: 105 MG/DL (ref 83–110)
GLUCOSE SERPL-MCNC: 167 MG/DL (ref 83–110)
HGB BLD-MCNC: 12.8 G/DL (ref 12–16)
LYMPHOCYTES # BLD: 1.1 THOU/UL (ref 1.2–3.4)
LYMPHOCYTES NFR BLD AUTO: 12.1 % (ref 21–51)
MCH RBC QN AUTO: 29.9 PG (ref 27–31)
MCV RBC AUTO: 91.2 FL (ref 78–98)
MONOCYTES # BLD AUTO: 0.6 THOU/UL (ref 0.11–0.59)
MONOCYTES NFR BLD AUTO: 6.1 % (ref 0–10)
NEUTROPHILS # BLD AUTO: 7.4 THOU/UL (ref 1.4–6.5)
NEUTROPHILS NFR BLD AUTO: 81.2 % (ref 42–75)
PLATELET # BLD AUTO: 315 THOU/UL (ref 130–400)
POTASSIUM SERPL-SCNC: 3.8 MMOL/L (ref 3.5–5.1)
POTASSIUM SERPL-SCNC: 5.8 MMOL/L (ref 3.5–5.1)
RBC # BLD AUTO: 4.27 MILL/UL (ref 4.2–5.4)
SODIUM SERPL-SCNC: 135 MMOL/L (ref 136–145)
SODIUM SERPL-SCNC: 139 MMOL/L (ref 136–145)
WBC # BLD AUTO: 9.1 THOU/UL (ref 4.8–10.8)

## 2018-08-27 RX ADMIN — Medication SCH ML: at 10:05

## 2018-08-27 RX ADMIN — Medication SCH ML: at 20:04

## 2018-08-27 RX ADMIN — DOCUSATE SODIUM 50 MG AND SENNOSIDES 8.6 MG SCH TAB: 8.6; 5 TABLET, FILM COATED ORAL at 19:59

## 2018-08-27 RX ADMIN — DOCUSATE SODIUM 50 MG AND SENNOSIDES 8.6 MG SCH TAB: 8.6; 5 TABLET, FILM COATED ORAL at 10:05

## 2018-08-27 NOTE — PRG
DATE OF SERVICE:  08/27/2018

 

OBJECTIVE:

VITAL SIGNS:  This morning, temperature is 97.9, pulse 94, respirations 32, sats 98%, blood pressure 
101/51.

GENERAL:  She is awake and responsive.

CHEST:  Denies any wheezing or crackles.

 

LABORATORY DATA AND IMAGING DATA:  White count is 9,000, hemoglobin and hematocrit 10 and 30, platele
t count is 320.  Electrolytes are normal.  X-ray still shows extensive right-sided infiltrate.

 

IMPRESSION:  Right-sided pneumonia, possibly aspiration; dementia.

 

PLAN:  Continue antibiotics, nebulizer treatments and steroids.  We will follow.  PT and supportive c
are.

## 2018-08-27 NOTE — PDOC.PN
- Subjective


Encounter Start Date: 08/27/18


Encounter Start Time: 13:00





Patient seen and examined for Resp failure. Some cough. SOB improving. No new 

complaints. No overnight events





- Objective


MAR Reviewed: Yes


Vital Signs & Weight: 


 Vital Signs (12 hours)











  Temp Pulse Resp BP Pulse Ox


 


 08/27/18 19:31  98.3 F  99  26 H  110/60  92 L


 


 08/27/18 18:19   81  24 H  


 


 08/27/18 12:09   105 H  28 H   90 L








 Weight











Weight                         208 lb 12.8 oz














I&O: 


 











 08/26/18 08/27/18 08/28/18





 06:59 06:59 06:59


 


Intake Total 1070 1350 


 


Balance 1070 1350 











Result Diagrams: 


 08/27/18 04:06





 08/27/18 09:28





Phys Exam





- Physical Examination


Constitutional: NAD


Respiratory: no wheezing, no rhonchi


Bibasilar rales R> L


Cardiovascular: RRR, no rub


Gastrointestinal: soft, non-tender, positive bowel sounds


Neurological: moves all 4 limbs





Dx/Plan





- Plan


DVT proph w/lovenox, DVT proph w/SCDs





IMPRESSION:


1. Acute hypoxic resp failure due to HCAP ?Pneumococcal vs Aspiration Pneumonia


2. Hypokalemia


3. Obesity BMI 31.7


4. CKD 2 / Dementia


5. Other issues per previous notes





PLAN:


Lab error today - Repeat labs normal Potassium


Cont Speech


Aspiration precautions


Cont Cefepime/Levaquin with Steroids


Nebs Q6h


Cont other meds as below


AM labs











Review of Systems





- Review of Systems


Respiratory: negative: Cough, Dry, Shortness of Breath, Hemoptysis, SOB with 

Excertion, Pleuritic Pain, Sputum, Wheezing


Cardiovascular: negative: chest pain, palpitations, orthopnea, paroxysmal 

nocturnal dyspnea, edema, light headedness, other





- Medications/Allergies


Allergies/Adverse Reactions: 


 Allergies











Allergy/AdvReac Type Severity Reaction Status Date / Time


 


No Known Allergies Allergy   Verified 08/24/18 23:55











Medications: 


 Current Medications





Acetaminophen (Tylenol)  650 mg PO Q4H PRN


   PRN Reason: Headache/Fever or Pain


   Last Admin: 08/27/18 10:42 Dose:  650 mg


Albuterol/Ipratropium (Duoneb)  3 ml NEB [id number] PRN


   PRN Reason: SOB &/or Wheezing


Albuterol/Ipratropium (Duoneb)  3 ml NEB [id number] [facility]


   Last Admin: 08/27/18 18:19 Dose:  3 ml


Aspirin (Aspirin Chewable)  81 mg PO HS [facility]


   Last Admin: 08/27/18 19:59 Dose:  81 mg


Bisacodyl (Dulcolax)  10 mg PO DAILYPRN PRN


   PRN Reason: Constipation


Donepezil HCl (Aricept)  10 mg PO HS [facility]


   Last Admin: 08/27/18 19:58 Dose:  10 mg


Famotidine (Pepcid)  20 mg PO BID [facility]


   Last Admin: 08/27/18 21:55 Dose:  20 mg


Fluoxetine HCl (Prozac)  40 mg PO BID [facility]


   Last Admin: 08/27/18 19:59 Dose:  40 mg


Furosemide (Lasix)  20 mg PO DAILY [facility]


   Last Admin: 08/27/18 10:04 Dose:  20 mg


Levofloxacin 750 mg/ Device  150 mls @ 100 mls/hr IVPB Q24HR [facility]


   Last Admin: 08/27/18 20:10 Dose:  150 mls


Cefepime HCl 1 gm/ Sodium (Chloride)  100 mls @ 200 mls/hr IVPB 0100,1300 [facility]


   Last Admin: 08/27/18 12:57 Dose:  100 mls


Lactulose (Lactulose)  20 gm PO DAILYPRN PRN


   PRN Reason: Constipation


   Last Admin: 08/26/18 17:47 Dose:  20 gm


Memantine (Namenda)  10 mg PO BID [facility]


   Last Admin: 08/27/18 19:59 Dose:  10 mg


Methylprednisolone Sodium Succinate (Solu-Medrol)  40 mg IVP Q8HR [facility]


   Last Admin: 08/27/18 15:10 Dose:  40 mg


Ondansetron HCl (Zofran Odt)  4 mg PO Q6H PRN


   PRN Reason: Nausea/Vomiting


Ondansetron HCl (Zofran)  4 mg IVP Q6H PRN


   PRN Reason: Nausea/Vomiting


Oxcarbazepine (Trileptal)  300 mg PO BID [facility]


   Last Admin: 08/27/18 19:59 Dose:  300 mg


Polyethylene Glycol (Miralax)  17 gm PO DAILY [facility]


   Last Admin: 08/27/18 10:29 Dose:  Not Given


Saccharomyces Boulardii (Florastor)  250 mg PO DAILY [facility]


   Last Admin: 08/27/18 10:04 Dose:  250 mg


Senna (Senokot)  2 tab PO HSPRN PRN


   PRN Reason: Constipation


   Last Admin: 08/26/18 17:50 Dose:  2 tab


Senna/Docusate Sodium (Senokot S)  1 tab PO BID [facility]


   Last Admin: 08/27/18 19:59 Dose:  1 tab


Sodium Chloride (Flush - Normal Saline)  10 ml IVF Q12HR [facility]


   Last Admin: 08/27/18 20:04 Dose:  10 ml


Sodium Chloride (Flush - Normal Saline)  10 ml IVF PRN PRN


   PRN Reason: Saline Flush


Tramadol HCl (Ultram)  50 mg PO QID PRN


   PRN Reason: Moderate Pain (4-6)


Trazodone HCl (Desyrel)  50 mg PO HS [facility]


   Last Admin: 08/27/18 19:59 Dose:  50 mg

## 2018-08-27 NOTE — PQF
CLINICAL DOCUMENTATION IMPROVEMENT CLARIFICATION FORM:  ICD-10 Updated



PLEASE DO AN ADDENDUM TO THE PROGRESS NOTE WITH ANY DOCUMENTATION UPDATES OR 
ADDITIONS AND CARRY THROUGH TO DC SUMMARY.   THANK YOU.



DATE:     8/27                                                                 
       ATTN:  DR. EARNEST MACDONALD



Please exercise your independent, professional judgment in responding to the 
clarification form. Clinical indicators are provided on the bottom of this form 
for your review



Please check appropriate box(s):



Conflicting documentation was noted in the Medical Record, please clarify if 
patient is being treated/monitored for:



[  ]  HEALTHCARE ASSOCIATED PNEUMONIA, ?PNEUMOCOCCAL (PN 8/26)



[  ]  RIGHT SIDED PNEUMONIA, POSSIBLY ASPIRATION (DR. EDWARDS CONSULT, PN 8/26 & 27
)



[  ] Other diagnosis ___________

[  ] Unable to determine



For continuity of documentation, please document condition throughout progress 
notes and discharge summary.  Thank You.



CLINICAL INDICATORS - SIGNS / SYMPTOMS/ LABS 



ER PHYSICIAN DOCUMENTATION 8/24:  CXR CONFIRMS PNA.  GIVEN ANTIBIOTICS FOR 
HCAP.  FINAL DIAGNOSES:  PNEUMONIA, HYPOXIA



ATTENDING H&P DOCUMENTATION 8/24:  IMPRESSION:  1)  ...ADMITTED FOR HYPOXEMIC 
RESPIRATORY FAILURE MOST LIKELY 2/2 HEALTHCARE ASSOCIATED PNEUMONIA



ATTENDING PN 8/25:  DX/PLAN:  1)  PNEUMONIA, UNSPECIFIED ORGANISM;           PN 
8/26:  DX/PLAN:  1)  ACUTE HYPOXEMIA RESPIRATORY FAILURE D/T HCAP ?PNEUMOCOCCAL



PULMONOLOGY DOCUMENTATION 8/25:  IMPRESSION:  1)  ABNORMAL XRAY SUGGESTIVE OF B 
BRONCHOPNEUMONIA, POSSIBLY ASPIRATION.  ...I HAVE ADDED MAXIPIME FOR ASPIRATION 
PNEUMONIA.



PULMONOLOGY PN 8/26:  IMPRESSION:  1)  R-SIDED PNEUMONIA;     PN 8/27:  R-SIDED 
PNEUMONIA, POSSIBLY ASPIRATION



CXR 8/24:  THERE ARE B INFILTRATES, RIGHT GREATER THAN LEFT; 8/25:  DIFFUSE R 
LUNG INFILTRATE IS AGAIN NOTED;  8/26:  AGAIN NOTED ARE DIFFUSE INCREASED 
INTERSTITIAL OPACITIES THROUGHOUT THE R LUNG WITH INCREASED INTERSTITIAL 
OPACITIES IN THE R MID LUNG ZONE



RISK FACTORS:

RECURRENT PNEUMONIA

ALZHEIMER DEMENTIA

ABNORMAL CXR



TREATMENT:

IV ANTIBIOTICS (ZOSYN & VANCOMYCIN IN ER 8/24; LEVAQUIN 8/24 - PRESENT; 
CEFEPIME 8/25 - PRESENT)

PULMONOLOGY CONSULT







THANK YOU!  Nessa



(This form is maintained as a part of the permanent medical record)

 2015 [employer].  All Rights Reserved

Nessa Moran RN, BSN    [email or web address]    Office:  [phone]

                                                              

[facility]

## 2018-08-28 LAB
ALBUMIN SERPL BCG-MCNC: 2.9 G/DL (ref 3.4–4.8)
ANION GAP SERPL CALC-SCNC: 14 MMOL/L (ref 10–20)
BASOPHILS # BLD AUTO: 0 THOU/UL (ref 0–0.2)
BASOPHILS NFR BLD AUTO: 0 % (ref 0–1)
BUN SERPL-MCNC: 18 MG/DL (ref 9.8–20.1)
BUN/CREAT SERPL: 21.95
CALCIUM SERPL-MCNC: 8.8 MG/DL (ref 7.8–10.44)
CHLORIDE SERPL-SCNC: 102 MMOL/L (ref 98–107)
CO2 SERPL-SCNC: 26 MMOL/L (ref 23–31)
CREAT CL PREDICTED SERPL C-G-VRATE: 79 ML/MIN (ref 70–130)
EOSINOPHIL # BLD AUTO: 0.1 THOU/UL (ref 0–0.7)
EOSINOPHIL NFR BLD AUTO: 0.8 % (ref 0–10)
GLUCOSE SERPL-MCNC: 124 MG/DL (ref 83–110)
HGB BLD-MCNC: 12.5 G/DL (ref 12–16)
LYMPHOCYTES # BLD: 1 THOU/UL (ref 1.2–3.4)
LYMPHOCYTES NFR BLD AUTO: 12.1 % (ref 21–51)
MCH RBC QN AUTO: 29.3 PG (ref 27–31)
MCV RBC AUTO: 91 FL (ref 78–98)
MONOCYTES # BLD AUTO: 0.5 THOU/UL (ref 0.11–0.59)
MONOCYTES NFR BLD AUTO: 6.1 % (ref 0–10)
NEUTROPHILS # BLD AUTO: 6.6 THOU/UL (ref 1.4–6.5)
NEUTROPHILS NFR BLD AUTO: 81 % (ref 42–75)
PLATELET # BLD AUTO: 298 THOU/UL (ref 130–400)
POTASSIUM SERPL-SCNC: 4.4 MMOL/L (ref 3.5–5.1)
RBC # BLD AUTO: 4.28 MILL/UL (ref 4.2–5.4)
SODIUM SERPL-SCNC: 138 MMOL/L (ref 136–145)
WBC # BLD AUTO: 8.1 THOU/UL (ref 4.8–10.8)

## 2018-08-28 RX ADMIN — Medication SCH ML: at 19:51

## 2018-08-28 RX ADMIN — Medication SCH ML: at 09:37

## 2018-08-28 RX ADMIN — DOCUSATE SODIUM 50 MG AND SENNOSIDES 8.6 MG SCH TAB: 8.6; 5 TABLET, FILM COATED ORAL at 09:37

## 2018-08-28 RX ADMIN — DOCUSATE SODIUM 50 MG AND SENNOSIDES 8.6 MG SCH TAB: 8.6; 5 TABLET, FILM COATED ORAL at 19:50

## 2018-08-28 NOTE — PRG
DATE OF SERVICE: 08/28/2018

 

This morning she is awake, alert, responsive, appears to be in no distress.

 

PHYSICAL EXAMINATION: 

VITAL SIGNS:  Sats are still low on nasal cannula.  They are apparently 89-90, respiration 22, temper
ature 97, pulse 89, blood pressure 135/83.

CHEST:  Bilateral crackles, right greater than left.

CARDIAC:  Normal S1-S2.  No gallops.

ABDOMEN:  Soft, no masses.

 

LABORATORY  DATA:  White count 38,000, no left shift.  Electrolytes are normal.

 

IMPRESSION:

1.  Right-sided pneumonia.  

2.  Atypical respiratory failure.

3.  Dementia, bipolar.

 

PLAN:  Switch her to oral antibiotics, prednisone.  She probably may need low flow O2 to go home on.

 

A baseline chest x-ray today.

 

I will follow.

## 2018-08-28 NOTE — PDOC.PN
- Subjective


Encounter Start Date: 08/28/18


Encounter Start Time: 12:00





Patient seen and examined for Pneumonia/Resp failure. No new complaints. 

Feeling better. No overnight events





- Objective


Resuscitation Status: 


 











Resuscitation Status           DNR:Do Not Resuscitate














MAR Reviewed: Yes


Vital Signs & Weight: 


 Vital Signs (12 hours)











  Temp Pulse Resp BP Pulse Ox


 


 08/28/18 19:20  97.7 F  94  22 H  119/79  95


 


 08/28/18 18:57   85  20   91 L


 


 08/28/18 14:00      96


 


 08/28/18 13:00      94 L


 


 08/28/18 12:45   87  20   92 L








 Weight











Weight                         208 lb 12.8 oz














I&O: 


 











 08/27/18 08/28/18 08/29/18





 06:59 06:59 06:59


 


Intake Total 1350  1120


 


Balance 1350  1120











Result Diagrams: 


 08/28/18 04:33





 08/28/18 04:33


Radiology Reviewed by me: Yes (CXR - B/L Pneumonia)





Phys Exam





- Physical Examination


Constitutional: NAD


Respiratory: no wheezing


B/L rhonchi with basilar rales


Cardiovascular: RRR, no rub


Gastrointestinal: soft, non-tender, positive bowel sounds


Musculoskeletal: no edema


Neurological: moves all 4 limbs





Dx/Plan





- Plan


plan discussed w/ family, PT/OT, DVT proph w/lovenox, DVT proph w/SCDs





IMPRESSION:


1. Acute hypoxic resp failure due to HCAP ?Pneumococcal vs Aspiration Pneumonia


2. Hypokalemia


3. Obesity BMI 31.7


4. CKD 2 / Dementia / Moderate PEM


5. Other issues per previous notes





PLAN:


Aspiration precautions


Cont Doxycycline with Steroids


Cont Nebs


Cont other meds as below


DNR confirmed with sister














Review of Systems





- Review of Systems


Constitutional: negative: fever, chills, sweats, weakness, malaise, other


Cardiovascular: negative: chest pain, palpitations, orthopnea, paroxysmal 

nocturnal dyspnea, edema, light headedness, other





- Medications/Allergies


Allergies/Adverse Reactions: 


 Allergies











Allergy/AdvReac Type Severity Reaction Status Date / Time


 


No Known Allergies Allergy   Verified 08/24/18 23:55











Medications: 


 Current Medications





Acetaminophen (Tylenol)  650 mg PO Q4H PRN


   PRN Reason: Headache/Fever or Pain


   Last Admin: 08/28/18 17:59 Dose:  650 mg


Albuterol/Ipratropium (Duoneb)  3 ml NEB [id number] PRN


   PRN Reason: SOB &/or Wheezing


Albuterol/Ipratropium (Duoneb)  3 ml NEB [id number] [facility]


   Last Admin: 08/28/18 18:57 Dose:  3 ml


Aspirin (Aspirin Chewable)  81 mg PO HS [facility]


   Last Admin: 08/28/18 19:50 Dose:  81 mg


Bisacodyl (Dulcolax)  10 mg PO DAILYPRN PRN


   PRN Reason: Constipation


   Last Admin: 08/28/18 13:23 Dose:  10 mg


Donepezil HCl (Aricept)  10 mg PO HS [facility]


   Last Admin: 08/28/18 19:50 Dose:  10 mg


Doxycycline Hyclate (Vibramycin)  100 mg PO BID [facility]


   Stop: 09/04/18 09:01


   Last Admin: 08/28/18 19:51 Dose:  100 mg


Enoxaparin Sodium (Lovenox)  40 mg SC 0900 [facility]


   Last Admin: 08/28/18 09:36 Dose:  40 mg


Famotidine (Pepcid)  20 mg PO BID [facility]


   Last Admin: 08/28/18 19:51 Dose:  20 mg


Fluoxetine HCl (Prozac)  40 mg PO BID [facility]


   Last Admin: 08/28/18 19:50 Dose:  40 mg


Furosemide (Lasix)  20 mg PO DAILY [facility]


   Last Admin: 08/28/18 09:37 Dose:  20 mg


Lactulose (Lactulose)  20 gm PO DAILYPRN PRN


   PRN Reason: Constipation


   Last Admin: 08/26/18 17:47 Dose:  20 gm


Melatonin (Melatonin)  3 mg PO HS PRN


   PRN Reason: Insomnia


   Last Admin: 08/28/18 22:03 Dose:  3 mg


Memantine (Namenda)  10 mg PO BID [facility]


   Last Admin: 08/28/18 19:51 Dose:  10 mg


Ondansetron HCl (Zofran Odt)  4 mg PO Q6H PRN


   PRN Reason: Nausea/Vomiting


Ondansetron HCl (Zofran)  4 mg IVP Q6H PRN


   PRN Reason: Nausea/Vomiting


   Last Admin: 08/28/18 17:59 Dose:  4 mg


Oxcarbazepine (Trileptal)  300 mg PO BID [facility]


   Last Admin: 08/28/18 22:03 Dose:  300 mg


Polyethylene Glycol (Miralax)  17 gm PO DAILY [facility]


   Last Admin: 08/28/18 09:37 Dose:  17 gm


Prednisone (Prednisone)  20 mg PO QAM-[facility]


Saccharomyces Boulardii (Florastor)  250 mg PO DAILY [facility]


   Last Admin: 08/28/18 09:37 Dose:  250 mg


Senna (Senokot)  2 tab PO HSPRN PRN


   PRN Reason: Constipation


   Last Admin: 08/26/18 17:50 Dose:  2 tab


Senna/Docusate Sodium (Senokot S)  1 tab PO BID [facility]


   Last Admin: 08/28/18 19:50 Dose:  1 tab


Sodium Chloride (Flush - Normal Saline)  10 ml IVF Q12HR [facility]


   Last Admin: 08/28/18 19:51 Dose:  10 ml


Sodium Chloride (Flush - Normal Saline)  10 ml IVF PRN PRN


   PRN Reason: Saline Flush


Tramadol HCl (Ultram)  50 mg PO QID PRN


   PRN Reason: Moderate Pain (4-6)


Trazodone HCl (Desyrel)  50 mg PO HS [facility]


   Last Admin: 08/28/18 19:50 Dose:  50 mg

## 2018-08-28 NOTE — RAD
PA AND LATERAL CHEST:

 

Date:  08/28/18 

 

INDICATION:

History of pneumonia. 

 

COMPARISON:  

Prior exam dated 08/26/18. 

 

FINDINGS:

Patchy air space opacity involving both lungs, right greater than left, remain. There is suspicion fo
r tiny bilateral pleural effusions, which are new. No pneumothorax is evident. No acute osseous abnor
mality is evident. 

 

IMPRESSION: 

1.  Bilateral pneumonia, similar to the comparison exam. 

2.  New tiny bilateral pleural effusions. 

 

 

POS: INÉSH

## 2018-08-29 RX ADMIN — Medication SCH ML: at 20:30

## 2018-08-29 RX ADMIN — DOCUSATE SODIUM 50 MG AND SENNOSIDES 8.6 MG SCH TAB: 8.6; 5 TABLET, FILM COATED ORAL at 20:29

## 2018-08-29 RX ADMIN — Medication SCH ML: at 08:08

## 2018-08-29 RX ADMIN — DOCUSATE SODIUM 50 MG AND SENNOSIDES 8.6 MG SCH TAB: 8.6; 5 TABLET, FILM COATED ORAL at 08:08

## 2018-08-29 NOTE — PRG
DATE OF SERVICE:  08/29/2018

 

This morning she is awake, alert, responsive.

 

PHYSICAL EXAMINATION:

VITAL SIGNS:  Her sats are better, they are 97 on 2 liters, pulse 80, respiration 20, temperature 97,
 blood pressure is 101/67.  She wants to go home.

CHEST:  Chest reveals decreased breath sounds without wheezing.

CARDIAC:  Normal S1, S2.

ABDOMEN:  Soft, no masses.

 

IMPRESSION:  Pneumonia, probably atypical.  All cultures negative.

 

PLAN:  She is on doxy, prednisone.  She is probably going to go home as per family's wishes.  Taper p
rednisone over 2 weeks.  Continue antibiotics for a week.

## 2018-08-29 NOTE — PDOC.PN
- Subjective


Encounter Start Date: 08/29/18


Encounter Start Time: 13:00





Patient seen and examined for Pneumonia. Refusing to wear O2 - Saturations 

drops in 70s without O2. No new complaints. No overnight events. Had BM





- Objective


Resuscitation Status: 


 











Resuscitation Status           DNR:Do Not Resuscitate














MAR Reviewed: Yes


Vital Signs & Weight: 


 Vital Signs (12 hours)











  Temp Pulse Resp BP Pulse Ox


 


 08/29/18 19:38  97.9 F  90  24 H  119/77  90 L


 


 08/29/18 18:11   88  18   91 L


 


 08/29/18 14:45      84 L


 


 08/29/18 13:03      100


 


 08/29/18 12:55   81  20   86 L








 Weight











Weight                         208 lb 12.8 oz














I&O: 


 











 08/28/18 08/29/18 08/30/18





 06:59 06:59 06:59


 


Intake Total  1120 1120


 


Balance  1120 1120











Result Diagrams: 


 08/30/18 04:03





 08/30/18 04:03


Radiology Reviewed by me: Yes (KUB - neg)





Phys Exam





- Physical Examination


Constitutional: NAD


Respiratory: no wheezing


Bibasilar rales with rhonchi


Cardiovascular: RRR, no rub


Gastrointestinal: soft, non-tender, positive bowel sounds


Musculoskeletal: no edema


Neurological: moves all 4 limbs





Dx/Plan





- Plan


DVT proph w/lovenox, DVT proph w/SCDs





IMPRESSION:


1. Acute hypoxic resp failure due to HCAP ?Pneumococcal vs Aspiration Pneumonia


2. Hypokalemia


3. Obesity BMI 31.7


4. 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


5. Other issues per previous notes





PLAN:


Cont Doxycycline with Steroids


?Hospice eval


Refusing to wear O2 most of the time


Cont Nebs


Cont other meds as below


DNR








Review of Systems





- Review of Systems


Constitutional: negative: fever, chills, sweats, weakness, malaise, other


Cardiovascular: negative: chest pain, palpitations, orthopnea, paroxysmal 

nocturnal dyspnea, edema, light headedness, other





- Medications/Allergies


Allergies/Adverse Reactions: 


 Allergies











Allergy/AdvReac Type Severity Reaction Status Date / Time


 


No Known Allergies Allergy   Verified 08/24/18 23:55











Medications: 


 Current Medications





Acetaminophen (Tylenol)  650 mg PO Q4H PRN


   PRN Reason: Headache/Fever or Pain


   Last Admin: 08/29/18 10:33 Dose:  650 mg


Albuterol/Ipratropium (Duoneb)  3 ml NEB [id number] PRN


   PRN Reason: SOB &/or Wheezing


Albuterol/Ipratropium (Duoneb)  3 ml NEB [id number] [facility]


   Last Admin: 08/29/18 18:11 Dose:  3 ml


Aspirin (Aspirin Chewable)  81 mg PO HS [facility]


   Last Admin: 08/29/18 20:29 Dose:  81 mg


Bisacodyl (Dulcolax)  10 mg PO DAILYPRN PRN


   PRN Reason: Constipation


   Last Admin: 08/28/18 13:23 Dose:  10 mg


Donepezil HCl (Aricept)  10 mg PO HS [facility]


   Last Admin: 08/29/18 18:27 Dose:  10 mg


Doxycycline Hyclate (Vibramycin)  100 mg PO BID [facility]


   Stop: 09/04/18 09:01


   Last Admin: 08/29/18 20:29 Dose:  100 mg


Enoxaparin Sodium (Lovenox)  40 mg SC 0900 [facility]


   Last Admin: 08/29/18 08:07 Dose:  40 mg


Famotidine (Pepcid)  20 mg PO BID [facility]


   Last Admin: 08/29/18 20:29 Dose:  20 mg


Fluoxetine HCl (Prozac)  40 mg PO BID [facility]


   Last Admin: 08/29/18 18:27 Dose:  40 mg


Lactulose (Lactulose)  20 gm PO DAILYPRN PRN


   PRN Reason: Constipation


   Last Admin: 08/26/18 17:47 Dose:  20 gm


Melatonin (Melatonin)  3 mg PO HS PRN


   PRN Reason: Insomnia


   Last Admin: 08/29/18 20:29 Dose:  3 mg


Memantine (Namenda)  10 mg PO BID [facility]


   Last Admin: 08/29/18 18:28 Dose:  10 mg


Ondansetron HCl (Zofran Odt)  4 mg PO Q6H PRN


   PRN Reason: Nausea/Vomiting


Ondansetron HCl (Zofran)  4 mg IVP Q6H PRN


   PRN Reason: Nausea/Vomiting


   Last Admin: 08/29/18 10:34 Dose:  4 mg


Oxcarbazepine (Trileptal)  300 mg PO BID [facility]


   Last Admin: 08/29/18 20:29 Dose:  300 mg


Polyethylene Glycol (Miralax)  17 gm PO DAILY [facility]


   Last Admin: 08/29/18 08:08 Dose:  17 gm


Prednisone (Prednisone)  20 mg PO QAM-WM [facility]


   Last Admin: 08/29/18 08:07 Dose:  20 mg


Saccharomyces Boulardii (Florastor)  250 mg PO DAILY [facility]


   Last Admin: 08/29/18 08:08 Dose:  250 mg


Senna (Senokot)  2 tab PO HSPRN PRN


   PRN Reason: Constipation


   Last Admin: 08/26/18 17:50 Dose:  2 tab


Senna/Docusate Sodium (Senokot S)  1 tab PO BID [facility]


   Last Admin: 08/29/18 20:29 Dose:  1 tab


Sodium Chloride (Flush - Normal Saline)  10 ml IVF Q12HR [facility]


   Last Admin: 08/29/18 20:30 Dose:  10 ml


Sodium Chloride (Flush - Normal Saline)  10 ml IVF PRN PRN


   PRN Reason: Saline Flush


Tramadol HCl (Ultram)  50 mg PO QID PRN


   PRN Reason: Moderate Pain (4-6)


Trazodone HCl (Desyrel)  50 mg PO HS [facility]


   Last Admin: 08/29/18 18:30 Dose:  50 mg

## 2018-08-29 NOTE — RAD
KUB AND LEFT LATERAL DECUBITUS VIEWS OF THE ABDOMEN:

 

History: Abdominal pain. 

 

FINDINGS: 

The bowel gas pattern is nonobstructed. No free air is visualized. Post-operative changes of the spin
e are seen. 

 

IMPRESSION: 

No acute findings. 

 

POS: ROCÍO

## 2018-08-29 NOTE — EKG
Test Reason : 

Blood Pressure : ***/*** mmHG

Vent. Rate : 084 BPM     Atrial Rate : 084 BPM

   P-R Int : 134 ms          QRS Dur : 108 ms

    QT Int : 418 ms       P-R-T Axes : 034 -34 053 degrees

   QTc Int : 493 ms

 

Normal sinus rhythm

Left axis deviation

Prolonged QT

Abnormal ECG

 

Confirmed by ALONZO MOORE ([id number]),  STEPHON WALDEN ([id number]) on 8/29/2018 2:51:08 PM

 

Referred By:             Confirmed By:ALONZO MOORE

## 2018-08-30 LAB
ALBUMIN SERPL BCG-MCNC: 3 G/DL (ref 3.4–4.8)
ANION GAP SERPL CALC-SCNC: 14 MMOL/L (ref 10–20)
BASOPHILS # BLD AUTO: 0 THOU/UL (ref 0–0.2)
BASOPHILS NFR BLD AUTO: 0.3 % (ref 0–1)
BUN SERPL-MCNC: 21 MG/DL (ref 9.8–20.1)
BUN/CREAT SERPL: 26.92
CALCIUM SERPL-MCNC: 8.9 MG/DL (ref 7.8–10.44)
CHLORIDE SERPL-SCNC: 101 MMOL/L (ref 98–107)
CO2 SERPL-SCNC: 28 MMOL/L (ref 23–31)
CREAT CL PREDICTED SERPL C-G-VRATE: 83 ML/MIN (ref 70–130)
EOSINOPHIL # BLD AUTO: 1.4 THOU/UL (ref 0–0.7)
EOSINOPHIL NFR BLD AUTO: 14 % (ref 0–10)
GLUCOSE SERPL-MCNC: 92 MG/DL (ref 83–110)
HGB BLD-MCNC: 13.7 G/DL (ref 12–16)
LYMPHOCYTES # BLD: 1.7 THOU/UL (ref 1.2–3.4)
LYMPHOCYTES NFR BLD AUTO: 17.5 % (ref 21–51)
MAGNESIUM SERPL-MCNC: 1.8 MG/DL (ref 1.6–2.6)
MCH RBC QN AUTO: 30.5 PG (ref 27–31)
MCV RBC AUTO: 91.5 FL (ref 78–98)
MONOCYTES # BLD AUTO: 0.9 THOU/UL (ref 0.11–0.59)
MONOCYTES NFR BLD AUTO: 9.3 % (ref 0–10)
NEUTROPHILS # BLD AUTO: 5.8 THOU/UL (ref 1.4–6.5)
NEUTROPHILS NFR BLD AUTO: 58.9 % (ref 42–75)
PLATELET # BLD AUTO: 270 THOU/UL (ref 130–400)
POTASSIUM SERPL-SCNC: 3.8 MMOL/L (ref 3.5–5.1)
RBC # BLD AUTO: 4.5 MILL/UL (ref 4.2–5.4)
SODIUM SERPL-SCNC: 139 MMOL/L (ref 136–145)
WBC # BLD AUTO: 9.8 THOU/UL (ref 4.8–10.8)

## 2018-08-30 RX ADMIN — Medication SCH ML: at 08:35

## 2018-08-30 RX ADMIN — DOCUSATE SODIUM 50 MG AND SENNOSIDES 8.6 MG SCH TAB: 8.6; 5 TABLET, FILM COATED ORAL at 08:33

## 2018-08-30 RX ADMIN — Medication SCH ML: at 20:49

## 2018-08-30 RX ADMIN — DOCUSATE SODIUM 50 MG AND SENNOSIDES 8.6 MG SCH TAB: 8.6; 5 TABLET, FILM COATED ORAL at 20:48

## 2018-08-30 NOTE — PDOC.PN
- Subjective


Encounter Start Date: 08/30/18


Encounter Start Time: 12:00





Patient seen and examined for Pneumonia/Resp failure. O2 desaturation due to 

patient removal. Overnight events noted.





- Objective


Resuscitation Status: 


 











Resuscitation Status           DNR:Do Not Resuscitate














MAR Reviewed: Yes


Vital Signs & Weight: 


 Vital Signs (12 hours)











  Temp Pulse Resp BP Pulse Ox


 


 08/30/18 20:02  97.9 F  96  22 H  92/59 L 


 


 08/30/18 19:35      88 L


 


 08/30/18 19:33      88 L


 


 08/30/18 12:25   89  28 H   91 L








 Weight











Weight                         208 lb 12.8 oz














I&O: 


 











 08/29/18 08/30/18 08/31/18





 06:59 06:59 06:59


 


Intake Total 1120 1370 


 


Balance 1120 1370 











Result Diagrams: 


 08/30/18 04:03





 08/30/18 04:03





Phys Exam





- Physical Examination


Constitutional: NAD


Respiratory: no wheezing, no rhonchi


Bibasilar rales


Cardiovascular: RRR, no rub


Gastrointestinal: soft, positive bowel sounds


Neurological: moves all 4 limbs





Dx/Plan





- Plan


DVT proph w/SCDs





IMPRESSION:


1. Acute hypoxic resp failure due to HCAP ?Pneumococcal vs Aspiration Pneumonia


2. Hypokalemia


3. Obesity BMI 31.7


4. 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


5. Other issues per previous notes





PLAN:


Hospice eval in progress


Cont Nebs


Cont other meds as below


DNR


Cont Doxycycline with Steroids


DC to NH once accepted by hospice








Review of Systems





- Review of Systems


Constitutional: negative: fever, chills, sweats, weakness, malaise, other


Cardiovascular: negative: chest pain, palpitations, orthopnea, paroxysmal 

nocturnal dyspnea, edema, light headedness, other





- Medications/Allergies


Allergies/Adverse Reactions: 


 Allergies











Allergy/AdvReac Type Severity Reaction Status Date / Time


 


No Known Allergies Allergy   Verified 08/24/18 23:55











Medications: 


 Current Medications





Acetaminophen (Tylenol)  650 mg PO Q4H PRN


   PRN Reason: Headache/Fever or Pain


   Last Admin: 08/29/18 10:33 Dose:  650 mg


Albuterol/Ipratropium (Duoneb)  3 ml NEB [id number] PRN


   PRN Reason: SOB &/or Wheezing


Albuterol/Ipratropium (Duoneb)  3 ml NEB [id number]


   Last Admin: 08/30/18 19:33 Dose:  3 ml


Aspirin (Aspirin Chewable)  81 mg PO HS [facility]


   Last Admin: 08/30/18 20:48 Dose:  81 mg


Bisacodyl (Dulcolax)  10 mg PO DAILYPRN PRN


   PRN Reason: Constipation


   Last Admin: 08/28/18 13:23 Dose:  10 mg


Donepezil HCl (Aricept)  10 mg PO HS [facility]


   Last Admin: 08/30/18 20:48 Dose:  10 mg


Doxycycline Hyclate (Vibramycin)  100 mg PO BID [facility]


   Stop: 09/04/18 09:01


   Last Admin: 08/30/18 20:48 Dose:  100 mg


Enoxaparin Sodium (Lovenox)  40 mg SC 0900 [facility]


   Last Admin: 08/30/18 08:34 Dose:  40 mg


Famotidine (Pepcid)  20 mg PO BID [facility]


   Last Admin: 08/30/18 20:49 Dose:  20 mg


Fluoxetine HCl (Prozac)  40 mg PO BID [facility]


   Last Admin: 08/30/18 20:49 Dose:  40 mg


Lactulose (Lactulose)  20 gm PO DAILYPRN PRN


   PRN Reason: Constipation


   Last Admin: 08/26/18 17:47 Dose:  20 gm


Melatonin (Melatonin)  3 mg PO HS PRN


   PRN Reason: Insomnia


   Last Admin: 08/30/18 23:04 Dose:  3 mg


Memantine (Namenda)  10 mg PO BID [facility]


   Last Admin: 08/30/18 20:48 Dose:  10 mg


Ondansetron HCl (Zofran Odt)  4 mg PO Q6H PRN


   PRN Reason: Nausea/Vomiting


   Last Admin: 08/30/18 11:07 Dose:  4 mg


Ondansetron HCl (Zofran)  4 mg IVP Q6H PRN


   PRN Reason: Nausea/Vomiting


   Last Admin: 08/29/18 10:34 Dose:  4 mg


Oxcarbazepine (Trileptal)  300 mg PO BID [facility]


   Last Admin: 08/30/18 20:48 Dose:  300 mg


Polyethylene Glycol (Miralax)  17 gm PO DAILY [facility]


   Last Admin: 08/30/18 08:34 Dose:  17 gm


Prednisone (Prednisone)  20 mg PO QAM-WM [facility]


   Last Admin: 08/30/18 08:33 Dose:  20 mg


Saccharomyces Boulardii (Florastor)  250 mg PO DAILY [facility]


   Last Admin: 08/30/18 08:33 Dose:  250 mg


Senna (Senokot)  2 tab PO HSPRN PRN


   PRN Reason: Constipation


   Last Admin: 08/26/18 17:50 Dose:  2 tab


Senna/Docusate Sodium (Senokot S)  1 tab PO BID [facility]


   Last Admin: 08/30/18 20:48 Dose:  1 tab


Sodium Chloride (Flush - Normal Saline)  10 ml IVF Q12HR [facility]


   Last Admin: 08/30/18 20:49 Dose:  10 ml


Sodium Chloride (Flush - Normal Saline)  10 ml IVF PRN PRN


   PRN Reason: Saline Flush


Tramadol HCl (Ultram)  50 mg PO QID PRN


   PRN Reason: Moderate Pain (4-6)


Trazodone HCl (Desyrel)  50 mg PO HS [facility]


   Last Admin: 08/30/18 20:48 Dose:  50 mg

## 2018-08-30 NOTE — PRG
DATE OF SERVICE:  08/30/2018

 

This morning she is awake, alert and responsive.  

 

PHYSICAL EXAMINATION: 

VITAL SIGNS:  Sats are 97, apparently on room air, pulse 88, respiratory rate 20, temperature 98, blo
od pressure 125/79.

CHEST:  No wheezing, no crackles.

CARDIAC:  Normal S1, S2.  No gallops.

ABDOMEN:  Soft, no masses. 

NEURO:  Awake, alert, responsive, very appropriate.

 

IMPRESSION:

1.  Dementia.  

2.  Bipolar. 

3.  Respiratory failure.

4.  Abnormal x-ray. 

5.  Pneumonia, probably atypical.  

 

White count is only 9,000.  Electrolytes are normal.

 

PLAN:  Suggest she could be discharged home on tapering dose of prednisone over 2 weeks with antibiot
ics for a week.

## 2018-08-31 VITALS — SYSTOLIC BLOOD PRESSURE: 124 MMHG | DIASTOLIC BLOOD PRESSURE: 68 MMHG

## 2018-08-31 VITALS — TEMPERATURE: 98 F

## 2018-08-31 RX ADMIN — DOCUSATE SODIUM 50 MG AND SENNOSIDES 8.6 MG SCH TAB: 8.6; 5 TABLET, FILM COATED ORAL at 08:20

## 2018-08-31 RX ADMIN — Medication SCH: at 08:22

## 2018-08-31 NOTE — PRG
DATE OF SERVICE:  08/31/2018

 

Shira Hi remains confused, agitated. 

 

PHYSICAL EXAMINATION: 

VITAL SIGNS:  Temperature 98, pulse 101, respiration 20, sats 90 on 2 liters, blood pressure 120/68. 
 She is coughing, but no shortness of breath.

CHEST:  Chest revealed decreased breath sounds, minimal rhonchi.

CARDIAC:  Normal S1, S2.

ABDOMEN:  Soft, no masses.

 

IMPRESSION:  Right-sided pneumonia, probably atypical respiratory failure.

 

PLAN:  Prednisone, doxycycline, supportive care and PT.

 

Eventually placement.

## 2018-09-03 NOTE — DIS
DATE OF DISCHARGE:  08/31/2018

 

DISCHARGE DISPOSITION:  Back to nursing home.  Hospice has been arranged.

 

DISCHARGE MEDICATIONS:

1.  Doxycycline 100 mg twice a day for 1 week.

2.  Lasix 20 mg daily as needed.

3.  Prednisone 20 mg daily to be tapered over next week.

3.  Senokot-S 1 tablet b.i.d.

4.  Florastor 250 mg daily.

5.  All other home medications were left, unchanged.

 

BRIEF HOSPITAL COURSE:  The patient is an 83-year-old female nursing home resident at [facility], who 
was brought into the hospital with shortness of breath.  Please refer to the history and physical for
 further details.

 

The patient was admitted to the hospital with a diagnosis of acute hypoxic respiratory failure second
karyna to healthcare-associated pneumonia, suspected pneumococcal versus aspiration pneumonia.  She was 
started on broad spectrum antibiotics that has been changed to oral per pulmonary recommendation.  Di sorto had some electrolyte imbalances including hypokalemia which were replaced.  She also had several ep
isodes of delirium as well as refusal to wear oxygen causing desaturation.  Hospice has been arranged
.  Plan of care was discussed with the sister, Alfreda, in detail.  She stated understanding.

 

FINAL DIAGNOSES:

1.  Acute hypoxic respiratory failure secondary to healthcare-associated pneumonia, suspected pneumoc
occal versus aspiration pneumonia.

2.  Hypokalemia.

3.  Obesity with a BMI at 31.7.

4.  Chronic kidney disease stage 2.

5.  Dementia with delirium.

6.  Moderate protein energy malnutrition.

7.  Gastroesophageal reflux disease.

8.  Deconditioning.

9.  Hyperlipidemia.

10.  Depression and anxiety.